# Patient Record
Sex: FEMALE | Race: WHITE
[De-identification: names, ages, dates, MRNs, and addresses within clinical notes are randomized per-mention and may not be internally consistent; named-entity substitution may affect disease eponyms.]

---

## 2018-05-31 ENCOUNTER — HOSPITAL ENCOUNTER (EMERGENCY)
Dept: HOSPITAL 76 - ED | Age: 53
Discharge: HOME | End: 2018-05-31
Payer: MEDICAID

## 2018-05-31 VITALS — DIASTOLIC BLOOD PRESSURE: 73 MMHG | SYSTOLIC BLOOD PRESSURE: 115 MMHG

## 2018-05-31 DIAGNOSIS — K21.9: ICD-10-CM

## 2018-05-31 DIAGNOSIS — I10: ICD-10-CM

## 2018-05-31 DIAGNOSIS — R07.9: Primary | ICD-10-CM

## 2018-05-31 DIAGNOSIS — M06.9: ICD-10-CM

## 2018-05-31 DIAGNOSIS — R00.2: ICD-10-CM

## 2018-05-31 DIAGNOSIS — Z96.641: ICD-10-CM

## 2018-05-31 LAB
ALBUMIN DIAFP-MCNC: 4.1 G/DL (ref 3.2–5.5)
ALBUMIN/GLOB SERPL: 1.4 {RATIO} (ref 1–2.2)
ALP SERPL-CCNC: 63 IU/L (ref 42–121)
ALT SERPL W P-5'-P-CCNC: 14 IU/L (ref 10–60)
ANION GAP SERPL CALCULATED.4IONS-SCNC: 6 MMOL/L (ref 6–13)
AST SERPL W P-5'-P-CCNC: 23 IU/L (ref 10–42)
BASOPHILS NFR BLD AUTO: 0 10^3/UL (ref 0–0.1)
BASOPHILS NFR BLD AUTO: 0.6 %
BILIRUB BLD-MCNC: 0.6 MG/DL (ref 0.2–1)
BUN SERPL-MCNC: 13 MG/DL (ref 6–20)
CALCIUM UR-MCNC: 9 MG/DL (ref 8.5–10.3)
CHLORIDE SERPL-SCNC: 100 MMOL/L (ref 101–111)
CO2 SERPL-SCNC: 30 MMOL/L (ref 21–32)
CREAT SERPLBLD-SCNC: 0.8 MG/DL (ref 0.4–1)
EOSINOPHIL # BLD AUTO: 0.2 10^3/UL (ref 0–0.7)
EOSINOPHIL NFR BLD AUTO: 5.8 %
ERYTHROCYTE [DISTWIDTH] IN BLOOD BY AUTOMATED COUNT: 13.9 % (ref 12–15)
GFRSERPLBLD MDRD-ARVRAT: 75 ML/MIN/{1.73_M2} (ref 89–?)
GLOBULIN SER-MCNC: 3 G/DL (ref 2.1–4.2)
GLUCOSE SERPL-MCNC: 98 MG/DL (ref 70–100)
HGB UR QL STRIP: 12.4 G/DL (ref 12–16)
LIPASE SERPL-CCNC: 35 U/L (ref 22–51)
LYMPHOCYTES # SPEC AUTO: 1.5 10^3/UL (ref 1.5–3.5)
LYMPHOCYTES NFR BLD AUTO: 33.9 %
MCH RBC QN AUTO: 28.9 PG (ref 27–31)
MCHC RBC AUTO-ENTMCNC: 33.7 G/DL (ref 32–36)
MCV RBC AUTO: 85.9 FL (ref 81–99)
MONOCYTES # BLD AUTO: 0.5 10^3/UL (ref 0–1)
MONOCYTES NFR BLD AUTO: 12.2 %
NEUTROPHILS # BLD AUTO: 2 10^3/UL (ref 1.5–6.6)
NEUTROPHILS # SNV AUTO: 4.3 X10^3/UL (ref 4.8–10.8)
NEUTROPHILS NFR BLD AUTO: 47.5 %
PDW BLD AUTO: 8.2 FL (ref 7.9–10.8)
PLATELET # BLD: 231 10^3/UL (ref 130–450)
PROT SPEC-MCNC: 7.1 G/DL (ref 6.7–8.2)
RBC MAR: 4.29 10^6/UL (ref 4.2–5.4)
SODIUM SERPLBLD-SCNC: 136 MMOL/L (ref 135–145)

## 2018-05-31 PROCEDURE — 83690 ASSAY OF LIPASE: CPT

## 2018-05-31 PROCEDURE — 99283 EMERGENCY DEPT VISIT LOW MDM: CPT

## 2018-05-31 PROCEDURE — 71275 CT ANGIOGRAPHY CHEST: CPT

## 2018-05-31 PROCEDURE — 71046 X-RAY EXAM CHEST 2 VIEWS: CPT

## 2018-05-31 PROCEDURE — 99284 EMERGENCY DEPT VISIT MOD MDM: CPT

## 2018-05-31 PROCEDURE — 36415 COLL VENOUS BLD VENIPUNCTURE: CPT

## 2018-05-31 PROCEDURE — 80053 COMPREHEN METABOLIC PANEL: CPT

## 2018-05-31 PROCEDURE — 93005 ELECTROCARDIOGRAM TRACING: CPT

## 2018-05-31 PROCEDURE — 84484 ASSAY OF TROPONIN QUANT: CPT

## 2018-05-31 PROCEDURE — 85025 COMPLETE CBC W/AUTO DIFF WBC: CPT

## 2018-05-31 NOTE — ED PHYSICIAN DOCUMENTATION
PD HPI CHEST PAIN





- Stated complaint


Stated Complaint: HBP/CHEST TIGHTNESS





- Chief complaint


Chief Complaint: Cardiac





- History obtained from


History obtained from: Patient, Family





- History of Present Illness


Timing - onset: Today (53-year-old woman with history of hypertension and 

rheumatoid arthritis.  She is maintained on atenolol and some immunologic's.  

Over the last couple weeks she has noted higher than normal blood pressures, 

usually around 160/100 at home in the mornings but it is better in the 

afternoon.  Today she was a physical therapy because she is 8 weeks out from a 

right total hip replacement and she felt her heart racing on the treadmill and 

then laid down and had mild chest heaviness for about a minute that is since 

gone.  She has no history of coronary disease.  No pedal edema or calf pain.  

No recent travel.)





Review of Systems


Ten Systems: 10 systems reviewed and negative


Constitutional: denies: Fever, Chills, Fatigue


Cardiac: denies: Pedal edema, Calf pain


Respiratory: denies: Dyspnea, Cough, Hemoptysis, Wheezing





PD PAST MEDICAL HISTORY





- Past Medical History


Past Medical History: Yes


Cardiovascular: Hypertension, Other


Respiratory: None


Endocrine/Autoimmune: None


GI: GERD


: None


HEENT: None


Psych: None


Musculoskeletal: Rheumatoid arthritis


Derm: None





- Past Surgical History


Past Surgical History: Yes


General: Cholecystectomy, Appendectomy


Ortho: Hip replacement


/GYN: Hysterectomy


HEENT: Tonsil/Adenoidectomy





- Present Medications


Home Medications: 


 Ambulatory Orders











 Medication  Instructions  Recorded  Confirmed


 


Atenolol 25 mg PO BID 03/13/17 09/05/17


 


Gabapentin 600 mg PO QID 03/13/17 09/05/17


 


Abatacept [Orencia] mg SQ 05/31/18 


 


Leflunomide [Arava] mg PO 05/31/18 














- Allergies


Allergies/Adverse Reactions: 


 Allergies











Allergy/AdvReac Type Severity Reaction Status Date / Time


 


codeine Allergy  Unknown Verified 05/31/18 11:35














- Social History


Does the pt smoke?: No


Smoking Status: Never smoker


Does the pt drink ETOH?: No


Does the pt have substance abuse?: No





- Immunizations


Immunizations are current?: Yes





- POLST


Patient has POLST: No





PD ED PE NORMAL





- Vitals


Vital signs reviewed: Yes





- General


General: Alert and oriented X 3, No acute distress





- HEENT


HEENT: PERRL, EOMI





- Neck


Neck: Supple, no meningeal sign, No bony TTP





- Cardiac


Cardiac: RRR, No murmur





- Respiratory


Respiratory: No respiratory distress, Clear bilaterally





- Abdomen


Abdomen: Normal bowel sounds, Soft, Non tender





- Back


Back: No CVA TTP, No spinal TTP





- Derm


Derm: Normal color, Warm and dry





- Extremities


Extremities: No edema, No calf tenderness / cord





- Neuro


Neuro: Alert and oriented X 3, Normal speech





Results





- Vitals


Vitals: 


 Vital Signs - 24 hr











  05/31/18 05/31/18 05/31/18





  11:32 13:48 15:29


 


Temperature 36.5 C  


 


Heart Rate 86 88 78


 


Respiratory 16 14 13





Rate   


 


Blood Pressure 143/91 H 142/87 H 115/73


 


O2 Saturation 99 100 100








 Oxygen











O2 Source                      Room air

















- EKG (time done)


  ** 1141


Rate: Rate (enter#) (88)


Rhythm: NSR


Axis: Normal


Intervals: Normal FL


QRS: Normal


Ischemia: Non specific changes (rsr prime v1/2)


Computer interpretation: Agree with computer





- Labs


Labs: 


 Laboratory Tests











  05/31/18 05/31/18 05/31/18





  11:49 11:49 11:49


 


WBC  4.3 L  


 


RBC  4.29  


 


Hgb  12.4  


 


Hct  36.9 L  


 


MCV  85.9  


 


MCH  28.9  


 


MCHC  33.7  


 


RDW  13.9  


 


Plt Count  231  


 


MPV  8.2  


 


Neut #  2.0  


 


Lymph #  1.5  


 


Mono #  0.5  


 


Eos #  0.2  


 


Baso #  0.0  


 


Absolute Nucleated RBC  0.00  


 


Nucleated RBC %  0.1  


 


Sodium   136 


 


Potassium   3.4 L 


 


Chloride   100 L 


 


Carbon Dioxide   30 


 


Anion Gap   6.0 


 


BUN   13 


 


Creatinine   0.8 


 


Estimated GFR (MDRD)   75 L 


 


Glucose   98 


 


Calcium   9.0 


 


Total Bilirubin   0.6 


 


AST   23 


 


ALT   14 


 


Alkaline Phosphatase   63 


 


Troponin I    < 0.04


 


Total Protein   7.1 


 


Albumin   4.1 


 


Globulin   3.0 


 


Albumin/Globulin Ratio   1.4 


 


Lipase   35 














  05/31/18





  14:42


 


WBC 


 


RBC 


 


Hgb 


 


Hct 


 


MCV 


 


MCH 


 


MCHC 


 


RDW 


 


Plt Count 


 


MPV 


 


Neut # 


 


Lymph # 


 


Mono # 


 


Eos # 


 


Baso # 


 


Absolute Nucleated RBC 


 


Nucleated RBC % 


 


Sodium 


 


Potassium 


 


Chloride 


 


Carbon Dioxide 


 


Anion Gap 


 


BUN 


 


Creatinine 


 


Estimated GFR (MDRD) 


 


Glucose 


 


Calcium 


 


Total Bilirubin 


 


AST 


 


ALT 


 


Alkaline Phosphatase 


 


Troponin I  < 0.04


 


Total Protein 


 


Albumin 


 


Globulin 


 


Albumin/Globulin Ratio 


 


Lipase 














- Rads (name of study)


  ** CT PA


Radiology: EMP read contemporaneously (negative)





PD MEDICAL DECISION MAKING





- ED course


ED course: 





53-year-old woman with history of rheumatoid arthritis and 8 weeks out from a 

hip replacement presents with a short episode of chest pain and palpitations 

while in physical therapy today.  Her diagnostics are negative here including 

delta troponin.  PE was considered given her postsurgical state but there is no 

evidence of this on CT.





Departure





- Departure


Disposition: 01 Home, Self Care


Clinical Impression: 


Chest pain


Qualifiers:


 Chest pain type: unspecified Qualified Code(s): R07.9 - Chest pain, unspecified





Condition: Good


Record reviewed to determine appropriate education?: Yes


Instructions:  ED Chest Pain NonCardiac


Comments: 


Return immediately for further episodes of chest pain.  Follow-up with your 

doctor and discuss stress testing. Also possible changes in blood pressure 

medications.


Discharge Date/Time: 05/31/18 15:29

## 2018-05-31 NOTE — XRAY REPORT
EXAM:

CHEST RADIOGRAPHY

 

EXAM DATE: 5/31/2018 12:02 PM.

 

CLINICAL HISTORY: Chest pain.

 

COMPARISON: None.

 

TECHNIQUE: 2 views.

 

FINDINGS: 

Lungs/Pleura: No focal opacities evident. No pleural effusion. No pneumothorax. Normal volumes.

 

Mediastinum: Heart and mediastinal contours are unremarkable.

 

Other: None.

 

IMPRESSION: Normal 2-view chest radiography.

 

[facility]

Referring Provider Line: [phone]

 

SITE ID: [id number]

## 2018-05-31 NOTE — CT REPORT
EXAM:

CT ANGIOGRAM CHEST

 

EXAM DATE: 5/31/2018 02:25 PM.

 

CLINICAL HISTORY: Chest pain, high heart rate status post THR. 

 

COMPARISON: None.

 

TECHNIQUE: Routine helical imaging was performed through the chest in the pulmonary arterial phase. I
V Contrast: 80 ML Isovue 300. Reconstructions: Coronal 3-D MIP reconstructions.Sagittal and coronal.

 

In accordance with CT protocol optimization, one or more of the following dose reduction techniques w
ere utilized for this exam: automated exposure control, adjustment of mA and/or KV based on patient s
ize, or use of iterative reconstructive technique.

 

FINDINGS: 

Pulmonary Arteries: 

Diagnostic quality: Adequate through the segmental arteries. No evidence for acute or chronic pulmona
ry emboli. 

 

RV/LV is within normal limits. There is no interventricular septal bowing. There is no reflux of cont
rast material in the IVC.

 

Lungs/Pleura: No consolidation, nodules, or edema. No effusions or pneumothorax.

 

Mediastinum: No cardiac enlargement or adenopathy. 

 

Thoracic Aorta: No thoracic aortic aneurysm or dissection.

 

Upper Abdomen: Status post cholecystectomy. No acute findings.

 

Other: No acute bone findings.

 

IMPRESSION: No evidence for pulmonary emboli. No acute findings are seen.

 

[facility]

Referring Provider Line: [phone]

 

SITE ID: [id number]

## 2018-05-31 NOTE — CT PRELIMINARY REPORT
Accession: [id number]

Exam: CT CHEST ANGIO (PE)

 

IMPRESSION: No evidence for pulmonary emboli. No acute findings are seen.

 

[facility] [place]

 

SITE ID: [id number]

## 2018-06-21 ENCOUNTER — HOSPITAL ENCOUNTER (EMERGENCY)
Dept: HOSPITAL 76 - ED | Age: 53
Discharge: HOME | End: 2018-06-21
Payer: MEDICAID

## 2018-06-21 ENCOUNTER — HOSPITAL ENCOUNTER (OUTPATIENT)
Dept: HOSPITAL 76 - EMS | Age: 53
Discharge: ANOTHER HOSPITAL | End: 2018-06-21
Attending: SURGERY
Payer: MEDICAID

## 2018-06-21 VITALS — SYSTOLIC BLOOD PRESSURE: 109 MMHG | DIASTOLIC BLOOD PRESSURE: 74 MMHG

## 2018-06-21 DIAGNOSIS — I10: ICD-10-CM

## 2018-06-21 DIAGNOSIS — M25.551: Primary | ICD-10-CM
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
## 2018-06-21 DIAGNOSIS — Z96.641: ICD-10-CM

## 2018-06-21 DIAGNOSIS — S73.004A: Primary | ICD-10-CM
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
## 2018-06-21 PROCEDURE — 72170 X-RAY EXAM OF PELVIS: CPT

## 2018-06-21 PROCEDURE — 99152 MOD SED SAME PHYS/QHP 5/>YRS: CPT

## 2018-06-21 PROCEDURE — 27266 TREAT HIP DISLOCATION: CPT

## 2018-06-21 PROCEDURE — 94770: CPT

## 2018-06-21 PROCEDURE — 99283 EMERGENCY DEPT VISIT LOW MDM: CPT

## 2018-06-21 NOTE — XRAY REPORT
Procedure Date:  06/21/2018   

Accession Number:  [id number] / [id number]                    

Procedure:  XR  - Hip w/Pelvis 2-3V RT CPT Code:  

 

FULL RESULT:

 

 

EXAM:

RIGHT HIP AND PELVIS RADIOGRAPHY

 

EXAM DATE: 6/21/2018 12:07 PM.

 

HISTORY: Right hip dislocation after lifting today.

 

COMPARISONS: 02/23/2017.

 

TECHNIQUE: 2 views including AP pelvis.

 

FINDINGS: Interval right total hip arthroplasty and superior lateral 

dislocation of prosthetic femoral head relative to acetabular component. 

No fracture or prosthetic loosening.

IMPRESSION:

1. Superior lateral dislocation of prosthetic right femoral head.

2. No fracture.

 

[facility]

## 2018-06-21 NOTE — ED PHYSICIAN DOCUMENTATION
PD HPI LOWER EXT INJURY





- Stated complaint


Stated Complaint: HIP DISLOCATED





- Chief complaint


Chief Complaint: General





- History obtained from


History obtained from: Patient





- History of Present Illness


PD HPI LOW EXT INJURY LOCATION: Right, Hip


Type of injury: Other (Dislocation)


Where injury occurred: Home


Timing - onset: How many minutes ago (Just prior to arrival.)


Contributing factors: Prior ortho surgery (3 months S/P right THR.)


Similar symptoms before: Has not had sx before





- Treatment prior to arrival


Treatment prior to arrival: 





Fentanyl 150 mcg IV administered by medics.





- Additional information


Additional information: 





The patient is a 53-year-old female who is 3 months status post right total hip 

replacement, who presents with pain in her right hip after squatting down this 

morning.  She felt her hip dislocate, and has been unable to move her right leg 

since that time.  She arrives via ambulance, and medics have administered 150 

g fentanyl IV.  She has no prior history of hip dislocation.





Review of Systems


Constitutional: denies: Fever


Cardiac: denies: Chest pain / pressure


Respiratory: denies: Dyspnea, Cough


GI: denies: Abdominal Pain, Nausea, Vomiting


Musculoskeletal: reports: Joint pain (right hip)


Neurologic: denies: Focal weakness, Numbness, Headache





PD PAST MEDICAL HISTORY





- Past Medical History


Past Medical History: Yes


Cardiovascular: Hypertension, Other


Respiratory: None


Endocrine/Autoimmune: None


GI: GERD


: None


HEENT: None


Psych: None


Musculoskeletal: Rheumatoid arthritis


Derm: None





- Past Surgical History


Past Surgical History: Yes


General: Cholecystectomy, Appendectomy


Ortho: Hip replacement


/GYN: Hysterectomy


HEENT: Tonsil/Adenoidectomy





- Present Medications


Home Medications: 


 Ambulatory Orders











 Medication  Instructions  Recorded  Confirmed


 


Atenolol 25 mg PO BID 03/13/17 09/05/17


 


Gabapentin 600 mg PO QID 03/13/17 09/05/17


 


Abatacept [Orencia] mg SQ 05/31/18 


 


Leflunomide [Arava] mg PO 05/31/18 


 


Omeprazole [PriLOSEC] 20 mg PO DAILY 06/21/18 06/21/18














- Allergies


Allergies/Adverse Reactions: 


 Allergies











Allergy/AdvReac Type Severity Reaction Status Date / Time


 


codeine Allergy  Unknown Verified 06/21/18 12:08














- Social History


Does the pt smoke?: No


Smoking Status: Never smoker


Does the pt drink ETOH?: No


Does the pt have substance abuse?: No





- Immunizations


Immunizations are current?: Yes





- POLST


Patient has POLST: No





PD ED PE NORMAL





- Vitals


Vital signs reviewed: Yes (hypertensive)





- General


General: Alert and oriented X 3, Well developed/nourished





- HEENT


HEENT: Atraumatic





- Cardiac


Cardiac: RRR





- Respiratory


Respiratory: No respiratory distress, Clear bilaterally





- Abdomen


Abdomen: Soft, Non tender





- Back


Back: No spinal TTP





- Derm


Derm: No rash





- Extremities


Extremities: No edema, No calf tenderness / cord, Other (The right leg is 

internally rotated and shortened, consistent with right hip dislocation.  

Distal neurovascular is intact.)





- Neuro


Neuro: Alert and oriented X 3, No motor deficit, No sensory deficit





Results





- Vitals


Vitals: 


 Oxygen











O2 Source                      Room air

















- Rads (name of study)


  ** Right hip


Radiology: Prelim report reviewed, EMP read contemporaneously, See rad report (

Superolateral dislocation of right femoral head.  No fracture.)





  ** Post reduction hip


Radiology: Prelim report reviewed, EMP read contemporaneously, See rad report (

Right hip joint alignment is anatomic following reduction.)





Procedures





- Reduction


Body part reduced: Right, Hip, prosthetic


Fracture or dislocation: Dislocation


Anesthesia: Fentanyl, Propofol, Versed


Hip reduction technique: Allis - flex/pull/rotate


Reduction aftercare: NV intact, Xray confirms reduction, Alignment improved, 

Patient tolerated well





PD MEDICAL DECISION MAKING





- ED course


Complexity details: reviewed results, re-evaluated patient, considered 

differential, d/w patient, d/w family


ED course: 





The patient's presentation is significant for right prosthetic hip dislocation.

  There is no evidence of fracture on radiographic examination.


Treatment in the emergency department included administration of fentanyl 100 

g IV followed by an additional 50 g.  After explaining the risks and benefits 

of procedural sedation to the patient and her , the right hip was 

reduced with the benefit of sedation using 1 mg Versed IV and 70 mg propofol 

IV.  Respiratory therapist and nurse were in attendance in addition to myself.  

After reduction of the dislocated hip, the patient recovered from sedation 

without any complications.  Postreduction x-ray reveals anatomic alignment.


I discussed with her and her partner the pathophysiology of prosthetic hip 

dislocation, maneuvers to avoid, as well as potentially worrisome signs or 

symptoms that should prompt reevaluation in the emergency department.





- Sepsis Event


Vital Signs: 


 Oxygen











O2 Source                      Room air

















Departure





- Departure


Disposition: 01 Home, Self Care


Clinical Impression: 


Hip dislocation, right


Qualifiers:


 Encounter type: initial encounter Qualified Code(s): S73.004A - Unspecified 

dislocation of right hip, initial encounter





Condition: Stable


Instructions:  ED Hip Replace Dislocation Reduc


Comments: 


You can use Tylenol or ibuprofen if needed for discomfort.


Follow up with your orthopedic surgeon if you have persistent pain in your hip.


Return to the emergency department if you develop recurrent dislocation, or 

otherwise worsening symptoms.


Discharge Date/Time: 06/21/18 13:51

## 2018-06-21 NOTE — XRAY REPORT
Procedure Date:  06/21/2018   

Accession Number:  [id number] / [id number]                    

Procedure:  XR  - Pelvis 1 View CPT Code:  

 

FULL RESULT:

 

 

EXAM:

PELVIS RADIOGRAPHY

 

EXAM DATE: 6/21/2018 01:06 PM.

 

CLINICAL HISTORY: Post reduction right hip.

 

COMPARISON: Earlier the same day at 1153 hrs..

 

TECHNIQUE: 1 view.

 

FINDINGS:

Bones: No acute fracture. Right hip arthroplasty in place.

 

Joints: Alignment is anatomic following reduction.

 

Soft Tissues: No significant abnormality.

IMPRESSION: Right hip joint alignment is anatomic following reduction.

 

[facility]

## 2019-04-04 ENCOUNTER — HOSPITAL ENCOUNTER (OUTPATIENT)
Dept: HOSPITAL 76 - EMS | Age: 54
Discharge: ANOTHER HOSPITAL | End: 2019-04-04
Attending: SURGERY
Payer: MEDICAID

## 2019-04-04 DIAGNOSIS — M79.604: Primary | ICD-10-CM

## 2019-04-04 DIAGNOSIS — T14.8XXA: ICD-10-CM

## 2019-10-08 ENCOUNTER — HOSPITAL ENCOUNTER (EMERGENCY)
Dept: HOSPITAL 76 - ED | Age: 54
Discharge: HOME | End: 2019-10-08
Payer: MEDICAID

## 2019-10-08 VITALS — DIASTOLIC BLOOD PRESSURE: 71 MMHG | SYSTOLIC BLOOD PRESSURE: 116 MMHG

## 2019-10-08 DIAGNOSIS — E04.2: ICD-10-CM

## 2019-10-08 DIAGNOSIS — M06.9: ICD-10-CM

## 2019-10-08 DIAGNOSIS — G50.1: ICD-10-CM

## 2019-10-08 DIAGNOSIS — I10: ICD-10-CM

## 2019-10-08 DIAGNOSIS — G51.9: Primary | ICD-10-CM

## 2019-10-08 LAB
ALBUMIN DIAFP-MCNC: 4.5 G/DL (ref 3.2–5.5)
ALBUMIN/GLOB SERPL: 1.4 {RATIO} (ref 1–2.2)
ALP SERPL-CCNC: 61 IU/L (ref 42–121)
ALT SERPL W P-5'-P-CCNC: 11 IU/L (ref 10–60)
ANION GAP SERPL CALCULATED.4IONS-SCNC: 13 MMOL/L (ref 6–13)
AST SERPL W P-5'-P-CCNC: 19 IU/L (ref 10–42)
BASOPHILS NFR BLD AUTO: 0 10^3/UL (ref 0–0.1)
BASOPHILS NFR BLD AUTO: 0.6 %
BILIRUB BLD-MCNC: 0.4 MG/DL (ref 0.2–1)
BUN SERPL-MCNC: 20 MG/DL (ref 6–20)
CALCIUM UR-MCNC: 9.8 MG/DL (ref 8.5–10.3)
CHLORIDE SERPL-SCNC: 100 MMOL/L (ref 101–111)
CO2 SERPL-SCNC: 26 MMOL/L (ref 21–32)
CREAT SERPLBLD-SCNC: 0.9 MG/DL (ref 0.4–1)
EOSINOPHIL # BLD AUTO: 0.3 10^3/UL (ref 0–0.7)
EOSINOPHIL NFR BLD AUTO: 3.9 %
ERYTHROCYTE [DISTWIDTH] IN BLOOD BY AUTOMATED COUNT: 16 % (ref 12–15)
GFRSERPLBLD MDRD-ARVRAT: 65 ML/MIN/{1.73_M2} (ref 89–?)
GLOBULIN SER-MCNC: 3.2 G/DL (ref 2.1–4.2)
GLUCOSE SERPL-MCNC: 122 MG/DL (ref 70–100)
HGB UR QL STRIP: 12.2 G/DL (ref 12–16)
LIPASE SERPL-CCNC: 38 U/L (ref 22–51)
LYMPHOCYTES # SPEC AUTO: 1.8 10^3/UL (ref 1.5–3.5)
LYMPHOCYTES NFR BLD AUTO: 25.6 %
MCH RBC QN AUTO: 26 PG (ref 27–31)
MCHC RBC AUTO-ENTMCNC: 31.5 G/DL (ref 32–36)
MCV RBC AUTO: 82.5 FL (ref 81–99)
MONOCYTES # BLD AUTO: 0.5 10^3/UL (ref 0–1)
MONOCYTES NFR BLD AUTO: 6.4 %
NEUTROPHILS # BLD AUTO: 4.5 10^3/UL (ref 1.5–6.6)
NEUTROPHILS # SNV AUTO: 7.2 X10^3/UL (ref 4.8–10.8)
NEUTROPHILS NFR BLD AUTO: 63.2 %
PDW BLD AUTO: 11.4 FL (ref 7.9–10.8)
PLATELET # BLD: 241 10^3/UL (ref 130–450)
PROT SPEC-MCNC: 7.7 G/DL (ref 6.7–8.2)
RBC MAR: 4.69 10^6/UL (ref 4.2–5.4)
SODIUM SERPLBLD-SCNC: 139 MMOL/L (ref 135–145)

## 2019-10-08 PROCEDURE — 70450 CT HEAD/BRAIN W/O DYE: CPT

## 2019-10-08 PROCEDURE — 99283 EMERGENCY DEPT VISIT LOW MDM: CPT

## 2019-10-08 PROCEDURE — 36415 COLL VENOUS BLD VENIPUNCTURE: CPT

## 2019-10-08 PROCEDURE — 83690 ASSAY OF LIPASE: CPT

## 2019-10-08 PROCEDURE — 99284 EMERGENCY DEPT VISIT MOD MDM: CPT

## 2019-10-08 PROCEDURE — 70491 CT SOFT TISSUE NECK W/DYE: CPT

## 2019-10-08 PROCEDURE — 85025 COMPLETE CBC W/AUTO DIFF WBC: CPT

## 2019-10-08 PROCEDURE — 80053 COMPREHEN METABOLIC PANEL: CPT

## 2019-10-08 NOTE — ED PHYSICIAN DOCUMENTATION
History of Present Illness





- Stated complaint


Stated Complaint: DIFFICULTY SWALLOWING





- Chief complaint


Chief Complaint: Heent





- History obtained from


History obtained from: Patient





- History of Present Illness


Timing: Other (54-year-old woman with history of stable rheumatoid arthritis 

presents with 2 months of worsening symptoms.  It started with left-sided facial

pain developed into tingling of the face on the left not involving the forehead,

now has difficulty swallowing and numbness on the left side of her throat she 

thinks and feeling like something stuck in her throat with a cough that is r

elated to her throat not her chest.  She denies fevers, chills, congestion, 

sinus drainage.  She saw her physician who is a resident and felt she might have

TMJ and referred her to ENT but has not been able to see them yet.)





Review of Systems


Constitutional: denies: Fever, Chills, Myalgias, Fatigue


Ears: reports: Ear pain


Nose: denies: Rhinorrhea / runny nose, Congestion


Throat: reports: Sore throat


Respiratory: reports: Cough.  denies: Dyspnea


GI: denies: Abdominal Pain





PD PAST MEDICAL HISTORY





- Past Medical History


Past Medical History: Yes


Cardiovascular: Hypertension, Other


Respiratory: None


Endocrine/Autoimmune: None


GI: GERD


: None


HEENT: None


Psych: None


Musculoskeletal: Rheumatoid arthritis


Derm: None





- Past Surgical History


Past Surgical History: Yes


General: Cholecystectomy, Appendectomy


Ortho: Hip replacement


/GYN: Hysterectomy


HEENT: Tonsil/Adenoidectomy





- Present Medications


Home Medications: 


                                Ambulatory Orders











 Medication  Instructions  Recorded  Confirmed


 


Atenolol 25 mg PO BID 03/13/17 09/05/17


 


Gabapentin 600 mg PO QID 03/13/17 09/05/17


 


Abatacept [Orencia] mg SQ 05/31/18 


 


Leflunomide [Arava] mg PO 05/31/18 


 


Omeprazole [PriLOSEC] 20 mg PO DAILY 06/21/18 06/21/18














- Allergies


Allergies/Adverse Reactions: 


                                    Allergies











Allergy/AdvReac Type Severity Reaction Status Date / Time


 


codeine Allergy  Unknown Verified 10/08/19 11:52














- Social History


Does the pt smoke?: No


Smoking Status: Never smoker


Does the pt drink ETOH?: No


Does the pt have substance abuse?: No





- Immunizations


Immunizations are current?: Yes





- POLST


Patient has POLST: No





PD ED PE NORMAL





- Vitals


Vital signs reviewed: Yes





- General


General: Alert and oriented X 3, No acute distress





- HEENT


HEENT: PERRL, EOMI, Other (She has mild asymmetry of the soft palate on palate 

raise.  Otherwise the visualized portions of the oropharynx are normal.  She has

 partial numbness of the lower left side of face not involving the forehead.  

Smile is slightly asymmetric.  Extra ocular movements are normal.)





- Neck


Neck: Supple, no meningeal sign, No bony TTP





- Neuro


Neuro: Alert and oriented X 3, Normal speech


Eye Opening: Spontaneous


Motor: Obeys Commands


Verbal: Oriented


GCS Score: 15





Results





- Vitals


Vitals: 


                               Vital Signs - 24 hr











  10/08/19





  11:50


 


Temperature 36.7 C


 


Heart Rate 91


 


Respiratory 16





Rate 


 


Blood Pressure 100/82 H


 


O2 Saturation 100








                                     Oxygen











O2 Source                      Room air

















- Labs


Labs: 


                                Laboratory Tests











  10/08/19 10/08/19





  12:45 12:45


 


WBC  7.2 


 


RBC  4.69 


 


Hgb  12.2 


 


Hct  38.7 


 


MCV  82.5 


 


MCH  26.0 L 


 


MCHC  31.5 L 


 


RDW  16.0 H 


 


Plt Count  241 


 


MPV  11.4 H 


 


Neut # (Auto)  4.5 


 


Lymph # (Auto)  1.8 


 


Mono # (Auto)  0.5 


 


Eos # (Auto)  0.3 


 


Baso # (Auto)  0.0 


 


Absolute Nucleated RBC  0.00 


 


Nucleated RBC %  0.0 


 


Sodium   139


 


Potassium   3.8


 


Chloride   100 L


 


Carbon Dioxide   26


 


Anion Gap   13.0


 


BUN   20


 


Creatinine   0.9


 


Estimated GFR (MDRD)   65 L


 


Glucose   122 H


 


Calcium   9.8


 


Total Bilirubin   0.4


 


AST   19


 


ALT   11


 


Alkaline Phosphatase   61


 


Total Protein   7.7


 


Albumin   4.5


 


Globulin   3.2


 


Albumin/Globulin Ratio   1.4


 


Lipase   38














- Rads (name of study)


  ** CT head and neck (neck w contrast)


Radiology: EMP read contemporaneously (Thyroid nodules, otherwise negative)





PD MEDICAL DECISION MAKING





- ED course


ED course: 





54-year-old woman presents with some odd symptoms with some cranial 

neuropathies.  As such CT imaging of the head and neck were done to evaluate for

 mass lesion and they were negative.  ENT follow-up was advised.





Departure





- Departure


Disposition: 01 Home, Self Care


Clinical Impression: 


 Cranial neuropathies, multiple, Facial pain





Condition: Good


Record reviewed to determine appropriate education?: Yes


Instructions:  ED Neuropathy Peripheral


Comments: 


The CT of the neck with contrast and head show only small thyroid nodules which 

would not explain your symptoms nor do they need specific followup. You should 

however followup with ENT as you are already trying to arrange.

## 2019-10-08 NOTE — CT REPORT
Reason:  L cranial neuropathies and L facial pain

Procedure Date:  10/08/2019   

Accession Number:  [id number] / [id number]                    

Procedure:  CT  - SOFT TISSUE NECK W CPT Code:  

 

FULL RESULT:

 

 

EXAM:

CT SOFT TISSUE NECK WITH CONTRAST.

 

EXAM DATE: 10/8/2019 01:29 PM.

 

HISTORY: 54-year-old female, left cranial neuropathies and left facial 

pain.

 

COMPARISONS: CT soft tissue neck 09/03/2017

 

TECHNIQUE: Routine soft tissue neck CT protocol with contrast. 

Reconstructions: Coronal and sagittal. IV contrast: OPTI 320 80ML.

 

In accordance with CT protocol optimization, one or more of the following 

dose reduction techniques were utilized for this exam: automated exposure 

control, adjustment of mA and/or KV based on patient size, or use of 

iterative reconstructive technique.

 

FINDINGS:

Visualized Intracranial Contents: Unremarkable.

 

Orbits: Symmetric and unremarkable.

 

Sinuses: Visualized paranasal sinuses and mastoid air cells are clear.

 

Oral cavity: The visualized oral cavity is unremarkable. The floor of the 

mouth is symmetric.

 

Pharynx: Pharyngeal mucosa is unremarkable. The infratemporal fossa, 

parapharyngeal spaces, and retropharyngeal space are unremarkable. The 

base of the tongue is symmetric and unremarkable. The airway is patent.

 

Larynx: Larynx and supraglottic airway are patent without mass lesion. 

Vocal cords are symmetric. The visualized trachea is unremarkable.

 

Parotid and Submandibular Glands: Symmetric and unremarkable.

 

Lymph Nodes: No enlarged lymph nodes are identified in the cervical, 

supraclavicular, and visualized superior mediastinal regions.

 

Soft tissues: Soft tissues are unremarkable. No mass lesion or abnormal 

enhancement.

 

Vascular Structures: Unremarkable.

 

Thyroid Gland: There is a 1.3 cm hypodense lesion in the right thyroid 

lobe (series 2 image 101) and a 9 mm lesion within the inferior left 

thyroid lobe (series 4 image 71), likely present on the prior study as 

well although better visualized on the current one. Given size below 1.5 

cm, no specific follow-up suggested.

 

Lung: The visualized lung apices are clear.

 

Bones: No evidence of acute fracture or malalignment. There are mild 

multilevel degenerative changes.

 

Other: None.

IMPRESSION:

1. There is a 1.3 cm hypodense lesion in the right thyroid lobe (series 2 

image 101) and a 9 mm lesion within the inferior left thyroid lobe 

(series 4 image 71), likely present on the prior study as well although 

better visualized on the current one. Given size below 1.5 cm, no 

specific follow-up suggested.

 

2. Otherwise no evidence of soft tissue mass, adenopathy, abscess, or 

definite mucosal abnormality.

 

[facility]

## 2019-10-08 NOTE — CT REPORT
Reason:  L cranial neuropathies and L facial pain

Procedure Date:  10/08/2019   

Accession Number:  [id number] / [id number]                    

Procedure:  CT  - HEAD WO CPT Code:  

 

FULL RESULT:

 

 

EXAM:

CT HEAD

 

EXAM DATE: 10/8/2019 01:29 PM.

 

CLINICAL HISTORY: Left cranial neuropathies and left facial pain.

 

COMPARISON: HEAD W/O 09/03/2017 1:48 PM.

 

TECHNIQUE: Multiaxial CT images were obtained from the foramen magnum to 

the vertex. Reformats: Sagittal and coronal. IV contrast: None.

 

In accordance with CT protocol optimization, one or more of the following 

dose reduction techniques were utilized for this exam: automated exposure 

control, adjustment of mA and/or KV based on patient size, or use of 

iterative reconstructive technique.

 

FINDINGS:

Parenchyma: No intraparenchymal hemorrhage. No evidence of mass, midline 

shift, or CT findings of infarction. Gray-white differentiation is 

distinct.

 

Extraaxial Spaces: Normal for age. No subdural or epidural collections 

identified.

 

Ventricles: Normal in size and position.

 

Sinuses and Orbits: Imaged paranasal sinuses, orbits, and mastoids show 

no significant abnormality.

 

Bones: No evidence of fracture or calvarial defect.

 

Other: None.

IMPRESSION: Normal unenhanced head CT. No CT findings to explain symptoms.

 

RADIA

## 2020-01-19 ENCOUNTER — HOSPITAL ENCOUNTER (EMERGENCY)
Dept: HOSPITAL 76 - ED | Age: 55
Discharge: HOME | End: 2020-01-19
Payer: MEDICAID

## 2020-01-19 ENCOUNTER — HOSPITAL ENCOUNTER (OUTPATIENT)
Dept: HOSPITAL 76 - EMS | Age: 55
Discharge: ANOTHER HOSPITAL | End: 2020-01-19
Attending: SURGERY
Payer: MEDICAID

## 2020-01-19 VITALS — DIASTOLIC BLOOD PRESSURE: 61 MMHG | SYSTOLIC BLOOD PRESSURE: 113 MMHG

## 2020-01-19 DIAGNOSIS — G50.1: Primary | ICD-10-CM

## 2020-01-19 DIAGNOSIS — G43.909: ICD-10-CM

## 2020-01-19 DIAGNOSIS — R11.0: ICD-10-CM

## 2020-01-19 DIAGNOSIS — R51: Primary | ICD-10-CM

## 2020-01-19 LAB
ALBUMIN DIAFP-MCNC: 4.3 G/DL (ref 3.2–5.5)
ALBUMIN/GLOB SERPL: 1.5 {RATIO} (ref 1–2.2)
ALP SERPL-CCNC: 53 IU/L (ref 42–121)
ALT SERPL W P-5'-P-CCNC: 12 IU/L (ref 10–60)
ANION GAP SERPL CALCULATED.4IONS-SCNC: 10 MMOL/L (ref 6–13)
AST SERPL W P-5'-P-CCNC: 19 IU/L (ref 10–42)
BASOPHILS NFR BLD AUTO: 0 10^3/UL (ref 0–0.1)
BASOPHILS NFR BLD AUTO: 0.4 %
BILIRUB BLD-MCNC: 0.3 MG/DL (ref 0.2–1)
BUN SERPL-MCNC: 15 MG/DL (ref 6–20)
CALCIUM UR-MCNC: 9.4 MG/DL (ref 8.5–10.3)
CHLORIDE SERPL-SCNC: 101 MMOL/L (ref 101–111)
CO2 SERPL-SCNC: 26 MMOL/L (ref 21–32)
CREAT SERPLBLD-SCNC: 0.7 MG/DL (ref 0.4–1)
EOSINOPHIL # BLD AUTO: 0.1 10^3/UL (ref 0–0.7)
EOSINOPHIL NFR BLD AUTO: 1.6 %
ERYTHROCYTE [DISTWIDTH] IN BLOOD BY AUTOMATED COUNT: 14.2 % (ref 12–15)
GFRSERPLBLD MDRD-ARVRAT: 87 ML/MIN/{1.73_M2} (ref 89–?)
GLOBULIN SER-MCNC: 2.9 G/DL (ref 2.1–4.2)
GLUCOSE SERPL-MCNC: 110 MG/DL (ref 70–100)
HGB UR QL STRIP: 11.1 G/DL (ref 12–16)
LIPASE SERPL-CCNC: 36 U/L (ref 22–51)
LYMPHOCYTES # SPEC AUTO: 1.4 10^3/UL (ref 1.5–3.5)
LYMPHOCYTES NFR BLD AUTO: 19.7 %
MAGNESIUM SERPL-MCNC: 2 MG/DL (ref 1.7–2.8)
MCH RBC QN AUTO: 26.2 PG (ref 27–31)
MCHC RBC AUTO-ENTMCNC: 31.7 G/DL (ref 32–36)
MCV RBC AUTO: 82.5 FL (ref 81–99)
MONOCYTES # BLD AUTO: 0.5 10^3/UL (ref 0–1)
MONOCYTES NFR BLD AUTO: 7 %
NEUTROPHILS # BLD AUTO: 4.9 10^3/UL (ref 1.5–6.6)
NEUTROPHILS # SNV AUTO: 6.9 X10^3/UL (ref 4.8–10.8)
NEUTROPHILS NFR BLD AUTO: 70.9 %
PDW BLD AUTO: 10.3 FL (ref 7.9–10.8)
PLATELET # BLD: 249 10^3/UL (ref 130–450)
PROT SPEC-MCNC: 7.2 G/DL (ref 6.7–8.2)
RBC MAR: 4.24 10^6/UL (ref 4.2–5.4)
SODIUM SERPLBLD-SCNC: 137 MMOL/L (ref 135–145)

## 2020-01-19 PROCEDURE — 80320 DRUG SCREEN QUANTALCOHOLS: CPT

## 2020-01-19 PROCEDURE — 83690 ASSAY OF LIPASE: CPT

## 2020-01-19 PROCEDURE — 96361 HYDRATE IV INFUSION ADD-ON: CPT

## 2020-01-19 PROCEDURE — 96375 TX/PRO/DX INJ NEW DRUG ADDON: CPT

## 2020-01-19 PROCEDURE — 99284 EMERGENCY DEPT VISIT MOD MDM: CPT

## 2020-01-19 PROCEDURE — 36415 COLL VENOUS BLD VENIPUNCTURE: CPT

## 2020-01-19 PROCEDURE — 80053 COMPREHEN METABOLIC PANEL: CPT

## 2020-01-19 PROCEDURE — 83735 ASSAY OF MAGNESIUM: CPT

## 2020-01-19 PROCEDURE — 85025 COMPLETE CBC W/AUTO DIFF WBC: CPT

## 2020-01-19 PROCEDURE — 96374 THER/PROPH/DIAG INJ IV PUSH: CPT

## 2020-01-19 PROCEDURE — 85651 RBC SED RATE NONAUTOMATED: CPT

## 2020-01-19 NOTE — ED PHYSICIAN DOCUMENTATION
PD HPI HEADACHE





- Stated complaint


Stated Complaint: L SIDE HEAD PAIN





- History obtained from


History obtained from: Patient





- History of Present Illness


Timing - onset: Last night (onet of left headache with nausea and light 

sensitivity last evening. Has had migraines remotely, with last one about a year

ago. She has been having 1 or 2 months of left sided facial pain in the jaw and 

cheek.  She had been seen by her primary care and also referred to ENT with 

evaluation by them.  No diagnosis currently.  They had not prescribed any 

medications for her other than NSAIDs.)


Timing - onset during: Rest


Timing - details: Gradual onset, Still present


Worst headache ever?: No: Worst headache ever? (She states her current headache 

is feeling similar to migraine she has had in the past.)


Location: Left


Quality: Throbbing, Aching


Associated symptoms: Nausea, Vision changes.  No: Fever, Stiff neck, Weakness, 

Syncope


Improved by: Dark room


Worsened by: Light


Contributing factors: No: Hypertension, Recent illness, Trauma


Similar symptoms before: Diagnosis (The current left-sided headache is similar 

to migraine she has had in the past.  However the ongoing left facial pain is 

different than her migraines but has been more consistent for now 1 or 2 

months.)


Recently seen: Clinic (ENT, Without a clear diagnosis for her facial pain.  The 

facial pain she has had is been fairly consistent with intermittent sharp jabs 

of pain along the mandible and cheek.  No rash or redness along the way.  No 

noted injury.  She denies any weakness or sensory changes in the face.)





Review of Systems


Constitutional: denies: Fever, Chills


Nose: denies: Rhinorrhea / runny nose, Congestion


Throat: denies: Sore throat


Respiratory: denies: Cough


Skin: denies: Rash, Lesions


Musculoskeletal: denies: Neck pain, Back pain





PD PAST MEDICAL HISTORY





- Past Medical History


Cardiovascular: None


Respiratory: None


Neuro: Migraines


Endocrine/Autoimmune: None





- Present Medications


Home Medications: 


                                Ambulatory Orders











 Medication  Instructions  Recorded  Confirmed


 


Gabapentin 300 mg PO TID 01/19/20 01/19/20


 


HYDROmorphone [Dilaudid] 2 mg PO Q6HR PRN 01/19/20 01/19/20


 


Hydrocodone/Acetaminophen [Norco 1 each PO Q6H PRN #20 tablet 01/19/20 





5-325 Tablet]   


 


Ondansetron Odt [Zofran] 4 mg TL Q6H PRN #15 tablet 01/19/20 


 


SUMAtriptan succinate [Sumatriptan 50 mg PO DAILY PRN #10 tablet 01/19/20 





Succinate]   


 


carBAMazepine [TEGretol] 100 mg PO BID #30 tablet 01/19/20 


 


dexAMETHasone [Decadron] 4 mg PO DAILY #7 tablet 01/19/20 














- Allergies


Allergies/Adverse Reactions: 


                                    Allergies











Allergy/AdvReac Type Severity Reaction Status Date / Time


 


codeine Allergy  Edema Verified 01/19/20 01:21














PD ED PE NORMAL





- Vitals


Vital signs reviewed: Yes





- General


General: Alert and oriented X 3, Well developed/nourished, Other (She does 

appear uncomfortable due to her headache and also winces intermittently with the

 facial pain.  She does have light sensitivity.)





- HEENT


HEENT: Ears normal, Pharynx benign, Dentition benign





- Neck


Neck: Supple, no meningeal sign, No adenopathy





- Cardiac


Cardiac: RRR, No murmur





- Respiratory


Respiratory: Clear bilaterally





- Abdomen


Abdomen: Soft, Non tender





- Derm


Derm: Normal color, Warm and dry





- Neuro


Neuro: Alert and oriented X 3, CNs 2-12 intact, No motor deficit, No sensory 

deficit, Normal speech, Other


Eye Opening: Spontaneous


Motor: Obeys Commands


Verbal: Oriented


GCS Score: 15





Results





- Vitals


Vitals: 


                               Vital Signs - 24 hr











  01/19/20 01/19/20 01/19/20





  01:15 01:45 02:35


 


Temperature 37 C  36.5 C


 


Heart Rate 89 89 87


 


Respiratory 16 16 15





Rate   


 


Blood Pressure 145/83 H 134/78 H 113/61


 


O2 Saturation 100 99 97








                                     Oxygen











O2 Source                      Room air

















- Labs


Labs: 


                                Laboratory Tests











  01/19/20 01/19/20 01/19/20





  01:20 01:20 01:20


 


WBC  6.9  


 


RBC  4.24  


 


Hgb  11.1 L  


 


Hct  35.0 L  


 


MCV  82.5  


 


MCH  26.2 L  


 


MCHC  31.7 L  


 


RDW  14.2  


 


Plt Count  249  


 


MPV  10.3  


 


Neut # (Auto)  4.9  


 


Lymph # (Auto)  1.4 L  


 


Mono # (Auto)  0.5  


 


Eos # (Auto)  0.1  


 


Baso # (Auto)  0.0  


 


Absolute Nucleated RBC  0.00  


 


Nucleated RBC %  0.0  


 


ESR    6


 


Sodium   137 


 


Potassium   3.2 L 


 


Chloride   101 


 


Carbon Dioxide   26 


 


Anion Gap   10.0 


 


BUN   15 


 


Creatinine   0.7 


 


Estimated GFR (MDRD)   87 L 


 


Glucose   110 H 


 


Calcium   9.4 


 


Magnesium   2.0 


 


Total Bilirubin   0.3 


 


AST   19 


 


ALT   12 


 


Alkaline Phosphatase   53 


 


Total Protein   7.2 


 


Albumin   4.3 


 


Globulin   2.9 


 


Albumin/Globulin Ratio   1.5 


 


Lipase   36 


 


Ethyl Alcohol   < 5.0 














PD MEDICAL DECISION MAKING





- ED course


Complexity details: re-evaluated patient (She is improved quite a bit with IV 

fluids and medications targeted at migraine.  We discussed the facial pain she 

has been having.  There is no diagnosis clearly at this point.  The character of

 it sounds like facial neuralgia neuralgia.  We could empirically try treating 

with anti-inflammatories and steroids.  I discussed with her potentially trying 

Tegretol to see if it would help with the neuralgia.  She is in favor of trying 

this at this point.  She is given medication to use for her migraine should 

those recur she states she had had improvement in the past with sumatriptan just

 does not have any current prescriptions.), considered differential, d/w patient





Departure





- Departure


Disposition: 01 Home, Self Care


Clinical Impression: 


 Acute facial pain





Migraine


Qualifiers:


 Migraine type: unspecified Status migrainosus presence: without status 

migrainosus Intractability: not intractable Qualified Code(s): G43.909 - 

Migraine, unspecified, not intractable, without status migrainosus





Condition: Stable


Record reviewed to determine appropriate education?: Yes


Instructions:  ED Headache Migraine, ED Neuralgia Trigeminal


Prescriptions: 


carBAMazepine [TEGretol] 100 mg PO BID #30 tablet


dexAMETHasone [Decadron] 4 mg PO DAILY #7 tablet


Hydrocodone/Acetaminophen [Norco 5-325 Tablet] 1 each PO Q6H PRN #20 tablet


 PRN Reason: Pain


Ondansetron Odt [Zofran] 4 mg TL Q6H PRN #15 tablet


 PRN Reason: Nausea / Vomiting


SUMAtriptan succinate [Sumatriptan Succinate] 50 mg PO DAILY PRN #10 tablet


 PRN Reason: Migraine


Comments: 


For the severe headache/migraine, you can use ondansetron nausea medicine along 

with an ibuprofen or naproxen and take those with sumatriptan migraine medicine.

 These would be for the migraine type headaches.  It would be as needed episodic

use.





The facial pain you have been having sounds like it may be an irritation of the 

facial nerve called the trigeminal nerve.  We can try some anti-inflammatories 

of Decadron steroid daily for a week.  Use Tylenol or hydrocodone if needed for 

pain.





Try carbamazepine medication to see if it decreases it over the next several 

days to a week or so.  Start with 100 mg at night for a few days and if doing 

okay with that, to increase to twice daily.





Follow-up with your primary care in the next week or so for reevaluation and 

further treatment ideas.


Discharge Date/Time: 01/19/20 02:50

## 2020-03-31 ENCOUNTER — HOSPITAL ENCOUNTER (OUTPATIENT)
Dept: HOSPITAL 76 - COV | Age: 55
Discharge: HOME | End: 2020-03-31
Attending: FAMILY MEDICINE
Payer: COMMERCIAL

## 2020-03-31 DIAGNOSIS — R05: Primary | ICD-10-CM

## 2020-03-31 PROCEDURE — 81599 UNLISTED MAAA: CPT

## 2020-06-11 ENCOUNTER — HOSPITAL ENCOUNTER (OUTPATIENT)
Dept: HOSPITAL 76 - LAB.R | Age: 55
Discharge: HOME | End: 2020-06-11
Attending: PHYSICIAN ASSISTANT
Payer: COMMERCIAL

## 2020-06-11 DIAGNOSIS — N30.90: Primary | ICD-10-CM

## 2020-06-11 PROCEDURE — 87086 URINE CULTURE/COLONY COUNT: CPT

## 2020-06-12 ENCOUNTER — HOSPITAL ENCOUNTER (OUTPATIENT)
Dept: HOSPITAL 76 - LAB | Age: 55
Discharge: HOME | End: 2020-06-12
Attending: PHYSICIAN ASSISTANT
Payer: COMMERCIAL

## 2020-06-12 DIAGNOSIS — N30.90: Primary | ICD-10-CM

## 2020-06-12 PROCEDURE — 87086 URINE CULTURE/COLONY COUNT: CPT

## 2020-07-08 ENCOUNTER — HOSPITAL ENCOUNTER (OUTPATIENT)
Dept: HOSPITAL 76 - DI | Age: 55
Discharge: HOME | End: 2020-07-08
Attending: GENERAL ACUTE CARE HOSPITAL
Payer: COMMERCIAL

## 2020-07-08 DIAGNOSIS — Z12.31: Primary | ICD-10-CM

## 2020-07-08 DIAGNOSIS — R92.1: ICD-10-CM

## 2020-07-08 PROCEDURE — 77067 SCR MAMMO BI INCL CAD: CPT

## 2020-07-08 PROCEDURE — 77063 BREAST TOMOSYNTHESIS BI: CPT

## 2020-07-16 NOTE — MAMMOGRAPHY REPORT
BILATERAL DIGITAL SCREENING MAMMOGRAM 3D/2D: 7/8/2020

CLINICAL: Routine screening.  

 

No prior exams were available for comparison.  The tissue of both breasts is heterogeneously dense. T
his may lower the sensitivity of mammography.  

 

 

There are grouped fine dystrophic heterogeneous calcifications in the left breast at 1 o'clock middle
 depth.  There is architectural distortion associated with the calcifications.  

No other significant masses, calcifications, or other findings are seen in either breast.  

 

IMPRESSION: INCOMPLETE: NEEDS ADDITIONAL IMAGING EVALUATION

The grouped fine dystrophic heterogeneous calcifications in the left breast are indeterminate.  Addit
ional views with possible ultrasound are recommended.  

 

 

 

This exam was interpreted at Station ID: [id number].  

 

NOTE: For mammograms, a report in lay terms will be sent to the patient. Approximately 15% of breast 
malignancies will not be visualized mammographically. In the management of a palpable breast mass, a 
negative mammogram must not discourage biopsy of a clinically suspicious lesion.

 

Electronically Signed By: Kenzie olivia/:7/15/2020 17:57:57  

 

 

 

ACR BI-RADS Category 0: Incomplete 3340F

PARENCHYMAL PATTERN: (D) - The breast(s) demonstrate(s) heterogeneously dense fibroglandular reji peres.

BI-RADS CATEGORY: (0) - 0

Mammo and US

[id number]

Immediate follow-up

LATERALITY: (B)

## 2020-08-05 ENCOUNTER — HOSPITAL ENCOUNTER (OUTPATIENT)
Dept: HOSPITAL 76 - LAB | Age: 55
Discharge: HOME | End: 2020-08-05
Attending: INTERNAL MEDICINE
Payer: COMMERCIAL

## 2020-08-05 DIAGNOSIS — M05.79: Primary | ICD-10-CM

## 2020-08-05 DIAGNOSIS — Z51.81: ICD-10-CM

## 2020-08-05 DIAGNOSIS — Z79.899: ICD-10-CM

## 2020-08-05 LAB
ALBUMIN DIAFP-MCNC: 3.9 G/DL (ref 3.2–5.5)
ALBUMIN/GLOB SERPL: 1.3 {RATIO} (ref 1–2.2)
ALP SERPL-CCNC: 48 IU/L (ref 42–121)
ALT SERPL W P-5'-P-CCNC: 13 IU/L (ref 10–60)
ANION GAP SERPL CALCULATED.4IONS-SCNC: 9 MMOL/L (ref 6–13)
AST SERPL W P-5'-P-CCNC: 19 IU/L (ref 10–42)
BASOPHILS NFR BLD AUTO: 0 10^3/UL (ref 0–0.1)
BASOPHILS NFR BLD AUTO: 0.4 %
BILIRUB BLD-MCNC: 0.5 MG/DL (ref 0.2–1)
BUN SERPL-MCNC: 20 MG/DL (ref 6–20)
CALCIUM UR-MCNC: 9.1 MG/DL (ref 8.5–10.3)
CHLORIDE SERPL-SCNC: 103 MMOL/L (ref 101–111)
CHOLEST SERPL-MCNC: 230 MG/DL
CO2 SERPL-SCNC: 27 MMOL/L (ref 21–32)
CREAT SERPLBLD-SCNC: 0.8 MG/DL (ref 0.4–1)
EOSINOPHIL # BLD AUTO: 0.1 10^3/UL (ref 0–0.7)
EOSINOPHIL NFR BLD AUTO: 1.4 %
ERYTHROCYTE [DISTWIDTH] IN BLOOD BY AUTOMATED COUNT: 14.5 % (ref 12–15)
GLOBULIN SER-MCNC: 2.9 G/DL (ref 2.1–4.2)
GLUCOSE SERPL-MCNC: 93 MG/DL (ref 70–100)
HDLC SERPL-MCNC: 67 MG/DL
HDLC SERPL: 3.4 {RATIO} (ref ?–4.4)
HGB UR QL STRIP: 12.4 G/DL (ref 12–16)
LDLC SERPL CALC-MCNC: 138 MG/DL
LDLC/HDLC SERPL: 2.1 {RATIO} (ref ?–4.4)
LYMPHOCYTES # SPEC AUTO: 1.5 10^3/UL (ref 1.5–3.5)
LYMPHOCYTES NFR BLD AUTO: 30.8 %
MCH RBC QN AUTO: 28.5 PG (ref 27–31)
MCHC RBC AUTO-ENTMCNC: 32.3 G/DL (ref 32–36)
MCV RBC AUTO: 88.3 FL (ref 81–99)
MONOCYTES # BLD AUTO: 0.4 10^3/UL (ref 0–1)
MONOCYTES NFR BLD AUTO: 7.2 %
NEUTROPHILS # BLD AUTO: 3 10^3/UL (ref 1.5–6.6)
NEUTROPHILS # SNV AUTO: 5 X10^3/UL (ref 4.8–10.8)
NEUTROPHILS NFR BLD AUTO: 60 %
PDW BLD AUTO: 10.3 FL (ref 7.9–10.8)
PLATELET # BLD: 207 10^3/UL (ref 130–450)
PROT SPEC-MCNC: 6.8 G/DL (ref 6.7–8.2)
RBC MAR: 4.35 10^6/UL (ref 4.2–5.4)
SODIUM SERPLBLD-SCNC: 139 MMOL/L (ref 135–145)
VLDLC SERPL-SCNC: 25 MG/DL

## 2020-08-05 PROCEDURE — 80061 LIPID PANEL: CPT

## 2020-08-05 PROCEDURE — 80053 COMPREHEN METABOLIC PANEL: CPT

## 2020-08-05 PROCEDURE — 36415 COLL VENOUS BLD VENIPUNCTURE: CPT

## 2020-08-05 PROCEDURE — 83721 ASSAY OF BLOOD LIPOPROTEIN: CPT

## 2020-08-05 PROCEDURE — 85025 COMPLETE CBC W/AUTO DIFF WBC: CPT

## 2020-09-10 ENCOUNTER — HOSPITAL ENCOUNTER (OUTPATIENT)
Dept: HOSPITAL 76 - DI | Age: 55
Discharge: HOME | End: 2020-09-10
Attending: NURSE PRACTITIONER
Payer: COMMERCIAL

## 2020-09-10 DIAGNOSIS — R92.8: Primary | ICD-10-CM

## 2020-09-10 PROCEDURE — 76642 ULTRASOUND BREAST LIMITED: CPT

## 2020-09-11 NOTE — ULTRASOUND REPORT
LIMITED ULTRASOUND OF LEFT BREAST: 9/10/2020

CLINICAL: Patient returns today to evaluate an architectural distortion in the left breast.  

 

Comparison is made to exams dated:  7/8/2020 mammogram - [facility], 8/19/2019 ultr
asound, 8/19/2019 mammogram, 2/27/2019 mammogram, and 12/18/2017 ultrasound - [facility].  

 Real-time ultrasound of the left breast 1-3 o'clock region was performed.  Gray scale images of the 
real-time examination were reviewed.  

 

No significant abnormalities were seen sonographically in the left breast.  Specifically, no finding 
to correspond to the patient's mammographic abnormality of focal calcifications. There is dense fibro
cystic tissue.

 

 

IMPRESSION: PROBABLY BENIGN 

No suspicious sonographic findings. 

The calcifications seen on mammogram have been relatively stable for one year, but two year stability
 has not been established.  A follow-up left mammogram in 6 months is recommended to demonstrate stab
ility of calcifications.   

Findings and recommendations were conveyed to the patient at time of exam. 

 

This exam was interpreted at Station ID: [id number].  

Electronically Signed By: Kenzie olivia/:9/10/2020 17:22:19  

 

 

 

Ultrasound BI-RADS: 3 Probably benign

BI-RADS CATEGORY: (3) - 3

Mammogram

[id number]

6 month follow-up

LATERALITY: (L)

## 2020-09-11 NOTE — MAMMOGRAPHY REPORT
UNILATERAL LEFT DIGITAL DIAGNOSTIC MAMMOGRAM 3D/2D: 9/10/2020

CLINICAL: Patient returns for magnifcation views of microcalcifications in the left breast.  

 

Comparison is made to exams dated:  7/8/2020 mammogram - [facility], 8/19/2019 ultr
asound, 8/19/2019 mammogram, 2/27/2019 mammogram, and 12/18/2017 ultrasound - [facility].  The tissue of left breast is heterogeneously dense. This may lower the sensitivity of ma
mmography.  

 

There are grouped fine dystrophic heterogeneous calcifications in the left breast at 1 o'clock middle
 depth.  These are not significantly changed compared to previous exams, but better seen in magnifica
tion views.  

No other significant masses or calcifications are seen in the breast.  

 

IMPRESSION: INCOMPLETE: NEEDS ADDITIONAL IMAGING EVALUATION

 

The grouped fine dystrophic heterogeneous calcifications in the left breast at 1 o'clock middle depth
 remain indeterminate.  An ultrasound is recommended to detect underlying masses. This was performed 
immediately following this exam.  

 

 

This exam was interpreted at Station ID: [id number].  

 

NOTE: For mammograms, a report in lay terms will be sent to the patient. Approximately 15% of breast 
malignancies will not be visualized mammographically. In the management of a palpable breast mass, a 
negative mammogram must not discourage biopsy of a clinically suspicious lesion.

 

Electronically Signed By: Kenzie olivia/:9/10/2020 15:41:30  

 

 

 

ACR BI-RADS Category 0: Incomplete 3340F

PARENCHYMAL PATTERN: (D) - The breast(s) demonstrate(s) heterogeneously dense fibroglandular parenchy
ma.

BI-RADS CATEGORY: (0) - 0

Ultrasound

[id number]

Immediate follow-up

LATERALITY: (B)
details…

## 2020-09-27 ENCOUNTER — HOSPITAL ENCOUNTER (EMERGENCY)
Dept: HOSPITAL 76 - ED | Age: 55
Discharge: HOME | End: 2020-09-27
Payer: COMMERCIAL

## 2020-09-27 VITALS — DIASTOLIC BLOOD PRESSURE: 76 MMHG | SYSTOLIC BLOOD PRESSURE: 136 MMHG

## 2020-09-27 DIAGNOSIS — E86.0: ICD-10-CM

## 2020-09-27 DIAGNOSIS — Z90.49: ICD-10-CM

## 2020-09-27 DIAGNOSIS — K44.9: ICD-10-CM

## 2020-09-27 DIAGNOSIS — R79.89: ICD-10-CM

## 2020-09-27 DIAGNOSIS — R07.89: Primary | ICD-10-CM

## 2020-09-27 DIAGNOSIS — I10: ICD-10-CM

## 2020-09-27 LAB
ALBUMIN DIAFP-MCNC: 4.2 G/DL (ref 3.2–5.5)
ALBUMIN/GLOB SERPL: 1.5 {RATIO} (ref 1–2.2)
ALP SERPL-CCNC: 45 IU/L (ref 42–121)
ALT SERPL W P-5'-P-CCNC: 12 IU/L (ref 10–60)
ANION GAP SERPL CALCULATED.4IONS-SCNC: 9 MMOL/L (ref 6–13)
AST SERPL W P-5'-P-CCNC: 19 IU/L (ref 10–42)
BASOPHILS NFR BLD AUTO: 0 10^3/UL (ref 0–0.1)
BASOPHILS NFR BLD AUTO: 0.4 %
BILIRUB BLD-MCNC: 0.5 MG/DL (ref 0.2–1)
BUN SERPL-MCNC: 22 MG/DL (ref 6–20)
CALCIUM UR-MCNC: 9.6 MG/DL (ref 8.5–10.3)
CHLORIDE SERPL-SCNC: 104 MMOL/L (ref 101–111)
CO2 SERPL-SCNC: 29 MMOL/L (ref 21–32)
CREAT SERPLBLD-SCNC: 0.9 MG/DL (ref 0.4–1)
EOSINOPHIL # BLD AUTO: 0.1 10^3/UL (ref 0–0.7)
EOSINOPHIL NFR BLD AUTO: 1.6 %
ERYTHROCYTE [DISTWIDTH] IN BLOOD BY AUTOMATED COUNT: 13.7 % (ref 12–15)
GLOBULIN SER-MCNC: 2.8 G/DL (ref 2.1–4.2)
GLUCOSE SERPL-MCNC: 117 MG/DL (ref 70–100)
HGB UR QL STRIP: 13.1 G/DL (ref 12–16)
LIPASE SERPL-CCNC: 51 U/L (ref 22–51)
LYMPHOCYTES # SPEC AUTO: 2.3 10^3/UL (ref 1.5–3.5)
LYMPHOCYTES NFR BLD AUTO: 46.6 %
MCH RBC QN AUTO: 29.1 PG (ref 27–31)
MCHC RBC AUTO-ENTMCNC: 32.9 G/DL (ref 32–36)
MCV RBC AUTO: 88.4 FL (ref 81–99)
MONOCYTES # BLD AUTO: 0.3 10^3/UL (ref 0–1)
MONOCYTES NFR BLD AUTO: 5.7 %
NEUTROPHILS # BLD AUTO: 2.2 10^3/UL (ref 1.5–6.6)
NEUTROPHILS # SNV AUTO: 4.9 X10^3/UL (ref 4.8–10.8)
NEUTROPHILS NFR BLD AUTO: 45.5 %
PDW BLD AUTO: 10.4 FL (ref 7.9–10.8)
PLATELET # BLD: 209 10^3/UL (ref 130–450)
PROT SPEC-MCNC: 7 G/DL (ref 6.7–8.2)
RBC MAR: 4.5 10^6/UL (ref 4.2–5.4)
SODIUM SERPLBLD-SCNC: 142 MMOL/L (ref 135–145)

## 2020-09-27 PROCEDURE — 84443 ASSAY THYROID STIM HORMONE: CPT

## 2020-09-27 PROCEDURE — 85379 FIBRIN DEGRADATION QUANT: CPT

## 2020-09-27 PROCEDURE — 36415 COLL VENOUS BLD VENIPUNCTURE: CPT

## 2020-09-27 PROCEDURE — 83880 ASSAY OF NATRIURETIC PEPTIDE: CPT

## 2020-09-27 PROCEDURE — 96361 HYDRATE IV INFUSION ADD-ON: CPT

## 2020-09-27 PROCEDURE — 71275 CT ANGIOGRAPHY CHEST: CPT

## 2020-09-27 PROCEDURE — 99284 EMERGENCY DEPT VISIT MOD MDM: CPT

## 2020-09-27 PROCEDURE — 84484 ASSAY OF TROPONIN QUANT: CPT

## 2020-09-27 PROCEDURE — 93005 ELECTROCARDIOGRAM TRACING: CPT

## 2020-09-27 PROCEDURE — 71045 X-RAY EXAM CHEST 1 VIEW: CPT

## 2020-09-27 PROCEDURE — 85025 COMPLETE CBC W/AUTO DIFF WBC: CPT

## 2020-09-27 PROCEDURE — 80053 COMPREHEN METABOLIC PANEL: CPT

## 2020-09-27 PROCEDURE — 96374 THER/PROPH/DIAG INJ IV PUSH: CPT

## 2020-09-27 PROCEDURE — 83690 ASSAY OF LIPASE: CPT

## 2020-09-27 NOTE — CT REPORT
PROCEDURE:  ANGIO CHEST W/WO

 

INDICATIONS:  PE study

 

CONTRAST:  IV CONTRAST: Optiray 320 ml: 80 PO CONTRAST: *NO PO CONTRAST 

 

TECHNIQUE:  

After the administration of intravenous contrast, 2 mm thick sections acquired from the pulmonary api
abigail to the posterior costophrenic angles.  3-dimensional maximum intensity projection (MIP) coronal a
nd sagittal reformats were then acquired through the thorax. For radiation dose reduction, the follow
ing was used:  automated exposure control, adjustment of mA and/or kV according to patient size. 

 

COMPARISON:  Prior chest CT, 5/31/2018. Correlation is made with the accompanying chest x-ray, 9/27/2
020

 

FINDINGS:  

Image quality:  Excellent.  

 

Pulmonary arteries:  Pulmonary arteries are normal in size, and demonstrate no intraluminal filling d
efects to suggest central pulmonary embolism.  

 

Lungs and pleura:  Lungs are clear.  No pleural effusions or pneumothorax.  Central and peripheral ai
rways are patent.  

 

Mediastinum:  Heart size is normal, without pericardial effusion.  No mediastinal or hilar adenopathy
.  Thoracic aorta is normal in caliber and enhancement.  Esophagus is normal in caliber. There is a s
mall hiatal hernia.  

 

Bones and chest wall:  No suspicious bony lesions.  Ribs and thoracic spine appear intact throughout.
  The thyroid is normal.  No axillary or supraclavicular adenopathy.  

 

Abdomen:  Cholecystectomy clips are seen.   Visualized upper abdominal solid organs appear normal in 
the early arterial phase of enhancement.  

 

 

 

 

 

IMPRESSION:  

 

Negative for pulmonary embolism.

 

Clear lungs.

 

 

 

Incidental note is made of:

Small hiatal hernia

Cholecystectomy

 

Reviewed by: Jose Kelley MD on 9/27/2020 3:47 PM AKDT

Approved by: Jose Kelley MD on 9/27/2020 3:47 PM AKDT

 

 

Station ID:  [id number]

## 2020-09-27 NOTE — ED PHYSICIAN DOCUMENTATION
PD HPI CHEST PAIN





- Stated complaint


Stated Complaint: CHEST PRESSURE





- Chief complaint


Chief Complaint: Cardiac





- History obtained from


History obtained from: Patient





- History of Present Illness


Timing - onset: How many days ago (10)


Timing - onset during: Rest


Timing - duration: Days (10)


Timing - details: Abrupt onset, Still present


Quality: Pressure


Location: Substernal, Left chest


Improved by: Other (sitting up or laying on the right side)


Worsened by: Exertion, Position.  No: Inspiration, Movement, Palpation


Associated symptoms: Shortness of air.  No: Diaphoresis, Nausea, Vomiting, 

Feeling faint / dizzy, General Weakness, Palpitations


Similar symptoms before: Has not had sx before


Recently seen: Clinic





- Additional information


Additional information: 





55-year-old female complains of a 10-day history of pressure into the left chest

that started with 2 sharp pains under the left breast and then developed 

pressure.  She denies any specific pain she denies any worsening of the pain 

with inspiration.  She does have an increase in pain if she is laying flat and 

she is able to roll onto her right side with relief of pain or sit up.  She also

is complaining of some exertional dyspnea.  She states she can walk about across

her house and will become short of breath.  She is able to get out of bed into 

the bathroom without developing shortness of breath. She has had prior normal 

stress echo done at the [facility] 2 years ago.





Review of Systems


Constitutional: denies: Fever


Eyes: denies: Decreased vision


Ears: denies: Ear pain


Nose: denies: Rhinorrhea / runny nose, Congestion


Throat: denies: Sore throat


Cardiac: reports: Chest pain / pressure, Palpitations.  denies: Pedal edema, 

Calf pain


Respiratory: reports: Dyspnea.  denies: Cough, Wheezing


GI: denies: Abdominal Pain, Nausea, Vomiting


: denies: Dysuria, Frequency





PD PAST MEDICAL HISTORY





- Past Medical History


Cardiovascular: None, Hypertension, Other


Respiratory: None


Neuro: Migraines


Endocrine/Autoimmune: None


GI: GERD


: None


HEENT: None


Psych: None


Musculoskeletal: Rheumatoid arthritis


Derm: None





- Past Surgical History


Past Surgical History: Yes


General: Cholecystectomy, Appendectomy


Ortho: Hip replacement, Other


/GYN: Hysterectomy


HEENT: Tonsil/Adenoidectomy





- Present Medications


Home Medications: 


                                Ambulatory Orders











 Medication  Instructions  Recorded  Confirmed


 


Gabapentin 600 mg PO QID 03/13/17 09/05/17


 


atenoloL [Atenolol] 25 mg PO BID 03/13/17 09/05/17


 


Abatacept [Orencia] mg SQ 05/31/18 


 


Leflunomide [Arava] mg PO 05/31/18 


 


Omeprazole [PriLOSEC] 20 mg PO DAILY 06/21/18 06/21/18


 


Gabapentin 300 mg PO TID 01/19/20 01/19/20


 


HYDROmorphone [Dilaudid] 2 mg PO Q6HR PRN 01/19/20 01/19/20


 


Hydrocodone/Acetaminophen [Norco 1 each PO Q6H PRN #20 tablet 01/19/20 





5-325 Tablet]   


 


Ondansetron Odt [Zofran] 4 mg TL Q6H PRN #15 tablet 01/19/20 


 


SUMAtriptan succinate [Sumatriptan 50 mg PO DAILY PRN #10 tablet 01/19/20 





Succinate]   


 


carBAMazepine [TEGretol] 100 mg PO BID #30 tablet 01/19/20 


 


dexAMETHasone [Decadron] 4 mg PO DAILY #7 tablet 01/19/20 


 


Hydrocodone/Acetaminophen 1 - 2 each PO Q6H PRN #14 tablet 09/27/20 





[Hydrocodone-Acetamin 5-325 mg]   














- Allergies


Allergies/Adverse Reactions: 


                                    Allergies











Allergy/AdvReac Type Severity Reaction Status Date / Time


 


codeine Allergy  Unknown Verified 09/27/20 13:25














- Social History


Does the pt smoke?: No


Smoking Status: Never smoker


Does the pt drink ETOH?: No


Does the pt have substance abuse?: No





- Immunizations


Immunizations are current?: Yes





- POLST


Patient has POLST: No





PD ED PE NORMAL





- Vitals


Vital signs reviewed: Yes (hypertensive )





- General


General: Alert and oriented X 3, No acute distress, Well developed/nourished





- HEENT


HEENT: Atraumatic, PERRL, EOMI





- Neck


Neck: Supple, no meningeal sign, No bony TTP





- Cardiac


Cardiac: RRR, No murmur





- Respiratory


Respiratory: No respiratory distress, Clear bilaterally





- Abdomen


Abdomen: Normal bowel sounds, Soft, Non tender, Non distended, No organomegaly





- Back


Back: No CVA TTP, No spinal TTP





- Derm


Derm: Normal color, Warm and dry, No rash





- Extremities


Extremities: No deformity, No edema





- Neuro


Neuro: Alert and oriented X 3, CNs 2-12 intact, No motor deficit, No sensory 

deficit, Normal speech


Eye Opening: Spontaneous


Motor: Obeys Commands


Verbal: Oriented


GCS Score: 15





- Psych


Psych: Normal mood, Normal affect





Results





- Vitals


Vitals: 


                               Vital Signs - 24 hr











  09/27/20 09/27/20 09/27/20





  13:21 14:17 14:34


 


Temperature 36.4 C L  


 


Heart Rate 78 73 69


 


Respiratory 12 17 13





Rate   


 


Blood Pressure 128/93 H 136/83 H 117/67


 


O2 Saturation 100 100 100














  09/27/20





  16:29


 


Temperature 


 


Heart Rate 77


 


Respiratory 19





Rate 


 


Blood Pressure 120/72


 


O2 Saturation 100








                                     Oxygen











O2 Source                      Room air

















- EKG (time done)


  ** 1319


Rate: Rate (enter#) (74)


Rhythm: NSR


Ischemia: Normal ST segments


Compare to prior EKG: Unchanged from prior EKG (SPT 5- no changes)


Computer interpretation: Agree with computer





- Labs


Labs: 


                                Laboratory Tests











  09/27/20 09/27/20 09/27/20





  13:55 13:55 13:55


 


WBC  4.9  


 


RBC  4.50  


 


Hgb  13.1  


 


Hct  39.8  


 


MCV  88.4  


 


MCH  29.1  


 


MCHC  32.9  


 


RDW  13.7  


 


Plt Count  209  


 


MPV  10.4  


 


Neut # (Auto)  2.2  


 


Lymph # (Auto)  2.3  


 


Mono # (Auto)  0.3  


 


Eos # (Auto)  0.1  


 


Baso # (Auto)  0.0  


 


Absolute Nucleated RBC  0.00  


 


Nucleated RBC %  0.0  


 


D-Dimer   


 


Sodium   142 


 


Potassium   4.0 


 


Chloride   104 


 


Carbon Dioxide   29 


 


Anion Gap   9.0 


 


BUN   22 H 


 


Creatinine   0.9 


 


Estimated GFR (MDRD)   65 L 


 


Glucose   117 H 


 


Calcium   9.6 


 


Total Bilirubin   0.5 


 


AST   19 


 


ALT   12 


 


Alkaline Phosphatase   45 


 


Troponin I High Sens    < 2.3 L


 


B-Natriuretic Peptide   


 


Total Protein   7.0 


 


Albumin   4.2 


 


Globulin   2.8 


 


Albumin/Globulin Ratio   1.5 


 


Lipase   51 


 


TSH   














  09/27/20 09/27/20 09/27/20





  13:55 13:55 13:55


 


WBC   


 


RBC   


 


Hgb   


 


Hct   


 


MCV   


 


MCH   


 


MCHC   


 


RDW   


 


Plt Count   


 


MPV   


 


Neut # (Auto)   


 


Lymph # (Auto)   


 


Mono # (Auto)   


 


Eos # (Auto)   


 


Baso # (Auto)   


 


Absolute Nucleated RBC   


 


Nucleated RBC %   


 


D-Dimer    612.7 H


 


Sodium   


 


Potassium   


 


Chloride   


 


Carbon Dioxide   


 


Anion Gap   


 


BUN   


 


Creatinine   


 


Estimated GFR (MDRD)   


 


Glucose   


 


Calcium   


 


Total Bilirubin   


 


AST   


 


ALT   


 


Alkaline Phosphatase   


 


Troponin I High Sens   


 


B-Natriuretic Peptide  42  


 


Total Protein   


 


Albumin   


 


Globulin   


 


Albumin/Globulin Ratio   


 


Lipase   


 


TSH   0.56 














- Rads (name of study)


  ** chest


Radiology: Prelim report reviewed (Impression: Normal portable chest.  Clear 

lungs.  Levoconvex scoliotic curvature.), EMP read indepedently, See rad report





  ** CT angios chest


Radiology: Prelim report reviewed (Impression: Negative for pulmonary embolism. 

Clear lungs.  Incidental note made of: Small hiatal hernia cholecystectomy), EMP

read indepedently, See rad report





Procedures





- IVC sono (time)


  ** 1420


Bedside IVC sono: IVC measures (cm) (1.22), IVC collapsed c insp (cm) 

(complete), Dehydration (est 1 liter deficit)





PD MEDICAL DECISION MAKING





- ED course


Complexity details: reviewed old records, reviewed results, re-evaluated 

patient, considered differential, d/w patient


ED course: 





54 y/o female with a 10 day history of exertional dyspnea and left sided chest 

pain. Her vitals here are normal, her CXR, EKG, trop and BNP are all normal. Her

D-dimer is elevated and a pulmonary angiogram is ordered. She is found to have a

completely collapsing IVC consistent with a 1 liter deficit and she is 

administered IV saline. The pulmonary angiogram is without obvious PE and she is

administered IV decadron and toradal for chest wall pain. 





Departure





- Departure


Disposition: 01 Home, Self Care


Clinical Impression: 


 Chest wall pain





Condition: Stable


Instructions:  ED Chest Pain Atypical Unkn Cause


Follow-Up: 


Edel Zamora ARNP [Primary Care Provider] - 


Prescriptions: 


Hydrocodone/Acetaminophen [Hydrocodone-Acetamin 5-325 mg] 1 - 2 each PO Q6H PRN 

#14 tablet


 PRN Reason: Pain

## 2020-09-27 NOTE — XRAY REPORT
PROCEDURE:  Chest 1 View X-Ray

 

INDICATIONS:  Chest pain

 

TECHNIQUE:  One view of the chest was acquired.  

 

COMPARISON:  5/31/2018 chest x-ray and chest CT

 

FINDINGS:  

 

Surgical changes and devices:  None.  

 

Lungs and pleura:  No pleural effusions or pneumothorax.  Lungs are clear.  

 

Mediastinum:  Mediastinal contours appear normal.  Heart size is normal.  

 

Bones and chest wall:  No suspicious bony lesions.  Mild levoconvex scoliotic curvature is seen.   Ov
erlying soft tissues appear unremarkable.  

 

 

 

IMPRESSION:  

 

Normal portable chest. Clear lungs.

 

Levoconvex scoliotic curvature.

 

Reviewed by: Jose Kelley MD on 9/27/2020 1:21 PM AKDT

Approved by: Jose Kelley MD on 9/27/2020 1:21 PM AKDT

 

 

Station ID:  [id number]

## 2020-10-02 ENCOUNTER — HOSPITAL ENCOUNTER (OUTPATIENT)
Dept: HOSPITAL 76 - LAB | Age: 55
Discharge: HOME | End: 2020-10-02
Attending: INTERNAL MEDICINE
Payer: COMMERCIAL

## 2020-10-02 DIAGNOSIS — Z51.81: ICD-10-CM

## 2020-10-02 DIAGNOSIS — M05.79: Primary | ICD-10-CM

## 2020-10-02 LAB
ALBUMIN DIAFP-MCNC: 4.1 G/DL (ref 3.2–5.5)
ALBUMIN/GLOB SERPL: 1.5 {RATIO} (ref 1–2.2)
ALP SERPL-CCNC: 45 IU/L (ref 42–121)
ALT SERPL W P-5'-P-CCNC: 13 IU/L (ref 10–60)
ANION GAP SERPL CALCULATED.4IONS-SCNC: 9 MMOL/L (ref 6–13)
AST SERPL W P-5'-P-CCNC: 17 IU/L (ref 10–42)
BASOPHILS NFR BLD AUTO: 0 10^3/UL (ref 0–0.1)
BASOPHILS NFR BLD AUTO: 0.6 %
BILIRUB BLD-MCNC: 0.6 MG/DL (ref 0.2–1)
BUN SERPL-MCNC: 25 MG/DL (ref 6–20)
CALCIUM UR-MCNC: 9.1 MG/DL (ref 8.5–10.3)
CHLORIDE SERPL-SCNC: 101 MMOL/L (ref 101–111)
CO2 SERPL-SCNC: 27 MMOL/L (ref 21–32)
CREAT SERPLBLD-SCNC: 0.9 MG/DL (ref 0.4–1)
EOSINOPHIL # BLD AUTO: 0.1 10^3/UL (ref 0–0.7)
EOSINOPHIL NFR BLD AUTO: 2.4 %
ERYTHROCYTE [DISTWIDTH] IN BLOOD BY AUTOMATED COUNT: 13.9 % (ref 12–15)
GLOBULIN SER-MCNC: 2.7 G/DL (ref 2.1–4.2)
GLUCOSE SERPL-MCNC: 129 MG/DL (ref 70–100)
HGB UR QL STRIP: 12.5 G/DL (ref 12–16)
LYMPHOCYTES # SPEC AUTO: 2.1 10^3/UL (ref 1.5–3.5)
LYMPHOCYTES NFR BLD AUTO: 40.7 %
MCH RBC QN AUTO: 28.3 PG (ref 27–31)
MCHC RBC AUTO-ENTMCNC: 31.5 G/DL (ref 32–36)
MCV RBC AUTO: 89.8 FL (ref 81–99)
MONOCYTES # BLD AUTO: 0.3 10^3/UL (ref 0–1)
MONOCYTES NFR BLD AUTO: 6.3 %
NEUTROPHILS # BLD AUTO: 2.5 10^3/UL (ref 1.5–6.6)
NEUTROPHILS # SNV AUTO: 5.1 X10^3/UL (ref 4.8–10.8)
NEUTROPHILS NFR BLD AUTO: 49.8 %
PDW BLD AUTO: 10.3 FL (ref 7.9–10.8)
PLATELET # BLD: 216 10^3/UL (ref 130–450)
PROT SPEC-MCNC: 6.8 G/DL (ref 6.7–8.2)
RBC MAR: 4.42 10^6/UL (ref 4.2–5.4)
SODIUM SERPLBLD-SCNC: 137 MMOL/L (ref 135–145)

## 2020-10-02 PROCEDURE — 85025 COMPLETE CBC W/AUTO DIFF WBC: CPT

## 2020-10-02 PROCEDURE — 36415 COLL VENOUS BLD VENIPUNCTURE: CPT

## 2020-10-02 PROCEDURE — 80053 COMPREHEN METABOLIC PANEL: CPT

## 2020-10-30 ENCOUNTER — HOSPITAL ENCOUNTER (OUTPATIENT)
Dept: HOSPITAL 76 - DI | Age: 55
Discharge: HOME | End: 2020-10-30
Attending: NURSE PRACTITIONER
Payer: COMMERCIAL

## 2020-10-30 DIAGNOSIS — R92.8: Primary | ICD-10-CM

## 2020-10-30 PROCEDURE — 76642 ULTRASOUND BREAST LIMITED: CPT

## 2020-11-03 NOTE — ULTRASOUND REPORT
LIMITED ULTRASOUND OF LEFT BREAST: 10/30/2020

CLINICAL: Diffuse left breast pain.  

 

Comparison is made to exams dated:  9/10/2020 ultrasound, 9/10/2020 mammogram - [facility], 12/18/2017 ultrasound, 8/19/2019 mammogram, and 2/27/2019 mammogram - [facility].  

 

Color flow ultrasound of the left breast 1-2 o'clock, and retroareolar regions was performed on the a
reas of interest.  Gray scale images of the real-time examination were reviewed.  

 

No discrete cystic or solid mass lesion identified in the area of pain and tenderness. Scattered foci
 of calcifications are noted.

 

IMPRESSION: SUSPICIOUS OF MALIGNANCY 

There is no discrete mass in the left breast to correspond with the pain and tenderness at 1 and 2 o'
clock. 

 

Grouped microcalcifications were demonstrated on the prior available mammograms from 2019 and 2020 in
 the upper outer quadrant possible corresponding to the areas of clinical concern. Patient reports pr
ior mammogram performed in [place]. Attempts will be made to obtain those exams for comparison. If the
 calcifications are new from prior imaging, a stereotactic guided biopsy is recommended. If the calci
fications were present previously, clinical followup is recommended for pain symptoms. Consider a alex
ast MRI if clinical concern persists. 

 

The findings were discussed with and the results were reviewed with the patient at the conclusion of 
the study.   

 

Future imaging is recommended as follows: 03/10/2021 follow-up left mammogram.  

 

 

 

This exam was interpreted at Station ID: [id number].  

Electronically Signed By: Ernesto Almazan M.D.  

ddp/:10/30/2020 15:10:30  

 

 

 

Ultrasound BI-RADS: 4 Suspicious for malignancy

BI-RADS CATEGORY: (4) - 4

None

[id number]

Immediate follow-up

LATERALITY: ()

## 2020-11-05 ENCOUNTER — HOSPITAL ENCOUNTER (OUTPATIENT)
Dept: HOSPITAL 76 - LAB | Age: 55
Discharge: HOME | End: 2020-11-05
Attending: INTERNAL MEDICINE
Payer: COMMERCIAL

## 2020-11-05 DIAGNOSIS — Z79.899: ICD-10-CM

## 2020-11-05 DIAGNOSIS — Z51.81: ICD-10-CM

## 2020-11-05 DIAGNOSIS — M05.79: Primary | ICD-10-CM

## 2020-11-05 LAB
ALBUMIN DIAFP-MCNC: 4.4 G/DL (ref 3.2–5.5)
ALBUMIN/GLOB SERPL: 1.6 {RATIO} (ref 1–2.2)
ALP SERPL-CCNC: 43 IU/L (ref 42–121)
ALT SERPL W P-5'-P-CCNC: 13 IU/L (ref 10–60)
ANION GAP SERPL CALCULATED.4IONS-SCNC: 11 MMOL/L (ref 6–13)
AST SERPL W P-5'-P-CCNC: 20 IU/L (ref 10–42)
BASOPHILS NFR BLD AUTO: 0 10^3/UL (ref 0–0.1)
BASOPHILS NFR BLD AUTO: 0.2 %
BILIRUB BLD-MCNC: 0.7 MG/DL (ref 0.2–1)
BUN SERPL-MCNC: 18 MG/DL (ref 6–20)
CALCIUM UR-MCNC: 9.9 MG/DL (ref 8.5–10.3)
CHLORIDE SERPL-SCNC: 106 MMOL/L (ref 101–111)
CO2 SERPL-SCNC: 26 MMOL/L (ref 21–32)
CREAT SERPLBLD-SCNC: 0.9 MG/DL (ref 0.4–1)
EOSINOPHIL # BLD AUTO: 0.2 10^3/UL (ref 0–0.7)
EOSINOPHIL NFR BLD AUTO: 3.6 %
ERYTHROCYTE [DISTWIDTH] IN BLOOD BY AUTOMATED COUNT: 13.9 % (ref 12–15)
GLOBULIN SER-MCNC: 2.7 G/DL (ref 2.1–4.2)
GLUCOSE SERPL-MCNC: 93 MG/DL (ref 70–100)
HGB UR QL STRIP: 12.8 G/DL (ref 12–16)
LYMPHOCYTES # SPEC AUTO: 1.7 10^3/UL (ref 1.5–3.5)
LYMPHOCYTES NFR BLD AUTO: 37 %
MCH RBC QN AUTO: 29.2 PG (ref 27–31)
MCHC RBC AUTO-ENTMCNC: 32.5 G/DL (ref 32–36)
MCV RBC AUTO: 90 FL (ref 81–99)
MONOCYTES # BLD AUTO: 0.4 10^3/UL (ref 0–1)
MONOCYTES NFR BLD AUTO: 8.5 %
NEUTROPHILS # BLD AUTO: 2.3 10^3/UL (ref 1.5–6.6)
NEUTROPHILS # SNV AUTO: 4.5 X10^3/UL (ref 4.8–10.8)
NEUTROPHILS NFR BLD AUTO: 50.5 %
PDW BLD AUTO: 10.6 FL (ref 7.9–10.8)
PLATELET # BLD: 218 10^3/UL (ref 130–450)
PROT SPEC-MCNC: 7.1 G/DL (ref 6.7–8.2)
RBC MAR: 4.38 10^6/UL (ref 4.2–5.4)
SODIUM SERPLBLD-SCNC: 143 MMOL/L (ref 135–145)

## 2020-11-05 PROCEDURE — 36415 COLL VENOUS BLD VENIPUNCTURE: CPT

## 2020-11-05 PROCEDURE — 80053 COMPREHEN METABOLIC PANEL: CPT

## 2020-11-05 PROCEDURE — 85025 COMPLETE CBC W/AUTO DIFF WBC: CPT

## 2020-11-18 ENCOUNTER — HOSPITAL ENCOUNTER (OUTPATIENT)
Dept: HOSPITAL 76 - LAB.S | Age: 55
Discharge: HOME | End: 2020-11-18
Attending: FAMILY MEDICINE
Payer: COMMERCIAL

## 2020-11-18 DIAGNOSIS — D64.9: Primary | ICD-10-CM

## 2020-11-18 DIAGNOSIS — R53.83: ICD-10-CM

## 2020-11-18 DIAGNOSIS — K21.9: ICD-10-CM

## 2020-11-18 DIAGNOSIS — N64.4: ICD-10-CM

## 2020-11-18 LAB
ALBUMIN DIAFP-MCNC: 4 G/DL (ref 3.2–5.5)
ALBUMIN/GLOB SERPL: 1.3 {RATIO} (ref 1–2.2)
ALP SERPL-CCNC: 94 IU/L (ref 42–121)
ALT SERPL W P-5'-P-CCNC: 149 IU/L (ref 10–60)
AMYLASE SERPL-CCNC: 46 U/L (ref 28–100)
ANION GAP SERPL CALCULATED.4IONS-SCNC: 7 MMOL/L (ref 6–13)
AST SERPL W P-5'-P-CCNC: 175 IU/L (ref 10–42)
BASOPHILS NFR BLD AUTO: 0 10^3/UL (ref 0–0.1)
BASOPHILS NFR BLD AUTO: 0.5 %
BILIRUB BLD-MCNC: 0.6 MG/DL (ref 0.2–1)
BUN SERPL-MCNC: 12 MG/DL (ref 6–20)
CALCIUM UR-MCNC: 9.7 MG/DL (ref 8.5–10.3)
CHLORIDE SERPL-SCNC: 102 MMOL/L (ref 101–111)
CO2 SERPL-SCNC: 31 MMOL/L (ref 21–32)
CREAT SERPLBLD-SCNC: 0.9 MG/DL (ref 0.4–1)
EOSINOPHIL # BLD AUTO: 0.3 10^3/UL (ref 0–0.7)
EOSINOPHIL NFR BLD AUTO: 6.9 %
ERYTHROCYTE [DISTWIDTH] IN BLOOD BY AUTOMATED COUNT: 13.5 % (ref 12–15)
GLOBULIN SER-MCNC: 3 G/DL (ref 2.1–4.2)
GLUCOSE SERPL-MCNC: 85 MG/DL (ref 70–100)
HGB UR QL STRIP: 13.1 G/DL (ref 12–16)
LIPASE SERPL-CCNC: 33 U/L (ref 22–51)
LYMPHOCYTES # SPEC AUTO: 1.7 10^3/UL (ref 1.5–3.5)
LYMPHOCYTES NFR BLD AUTO: 44.3 %
MCH RBC QN AUTO: 29 PG (ref 27–31)
MCHC RBC AUTO-ENTMCNC: 32.4 G/DL (ref 32–36)
MCV RBC AUTO: 89.6 FL (ref 81–99)
MONOCYTES # BLD AUTO: 0.4 10^3/UL (ref 0–1)
MONOCYTES NFR BLD AUTO: 10.1 %
NEUTROPHILS # BLD AUTO: 1.4 10^3/UL (ref 1.5–6.6)
NEUTROPHILS # SNV AUTO: 3.8 X10^3/UL (ref 4.8–10.8)
NEUTROPHILS NFR BLD AUTO: 38.2 %
PDW BLD AUTO: 11 FL (ref 7.9–10.8)
PLATELET # BLD: 225 10^3/UL (ref 130–450)
PROT SPEC-MCNC: 7 G/DL (ref 6.7–8.2)
RBC MAR: 4.51 10^6/UL (ref 4.2–5.4)
SODIUM SERPLBLD-SCNC: 140 MMOL/L (ref 135–145)

## 2020-11-18 PROCEDURE — 85025 COMPLETE CBC W/AUTO DIFF WBC: CPT

## 2020-11-18 PROCEDURE — 83690 ASSAY OF LIPASE: CPT

## 2020-11-18 PROCEDURE — 82150 ASSAY OF AMYLASE: CPT

## 2020-11-18 PROCEDURE — 36415 COLL VENOUS BLD VENIPUNCTURE: CPT

## 2020-11-18 PROCEDURE — 80053 COMPREHEN METABOLIC PANEL: CPT

## 2020-11-18 PROCEDURE — 84443 ASSAY THYROID STIM HORMONE: CPT

## 2020-12-02 ENCOUNTER — HOSPITAL ENCOUNTER (OUTPATIENT)
Dept: HOSPITAL 76 - LAB | Age: 55
Discharge: HOME | End: 2020-12-02
Attending: INTERNAL MEDICINE
Payer: COMMERCIAL

## 2020-12-02 DIAGNOSIS — M05.79: Primary | ICD-10-CM

## 2020-12-02 DIAGNOSIS — Z51.81: ICD-10-CM

## 2020-12-02 LAB
ALBUMIN DIAFP-MCNC: 4.1 G/DL (ref 3.2–5.5)
ALBUMIN/GLOB SERPL: 1.6 {RATIO} (ref 1–2.2)
ALP SERPL-CCNC: 48 IU/L (ref 42–121)
ALT SERPL W P-5'-P-CCNC: 15 IU/L (ref 10–60)
ANION GAP SERPL CALCULATED.4IONS-SCNC: 9 MMOL/L (ref 6–13)
AST SERPL W P-5'-P-CCNC: 16 IU/L (ref 10–42)
BASOPHILS NFR BLD AUTO: 0 10^3/UL (ref 0–0.1)
BASOPHILS NFR BLD AUTO: 0.4 %
BILIRUB BLD-MCNC: 0.4 MG/DL (ref 0.2–1)
BUN SERPL-MCNC: 19 MG/DL (ref 6–20)
CALCIUM UR-MCNC: 10 MG/DL (ref 8.5–10.3)
CHLORIDE SERPL-SCNC: 102 MMOL/L (ref 101–111)
CO2 SERPL-SCNC: 28 MMOL/L (ref 21–32)
CREAT SERPLBLD-SCNC: 1 MG/DL (ref 0.4–1)
EOSINOPHIL # BLD AUTO: 0.2 10^3/UL (ref 0–0.7)
EOSINOPHIL NFR BLD AUTO: 4.1 %
ERYTHROCYTE [DISTWIDTH] IN BLOOD BY AUTOMATED COUNT: 13.6 % (ref 12–15)
GLOBULIN SER-MCNC: 2.6 G/DL (ref 2.1–4.2)
GLUCOSE SERPL-MCNC: 103 MG/DL (ref 70–100)
HGB UR QL STRIP: 12.6 G/DL (ref 12–16)
LYMPHOCYTES # SPEC AUTO: 2.2 10^3/UL (ref 1.5–3.5)
LYMPHOCYTES NFR BLD AUTO: 40.2 %
MCH RBC QN AUTO: 29.3 PG (ref 27–31)
MCHC RBC AUTO-ENTMCNC: 32.8 G/DL (ref 32–36)
MCV RBC AUTO: 89.3 FL (ref 81–99)
MONOCYTES # BLD AUTO: 0.5 10^3/UL (ref 0–1)
MONOCYTES NFR BLD AUTO: 9.7 %
NEUTROPHILS # BLD AUTO: 2.5 10^3/UL (ref 1.5–6.6)
NEUTROPHILS # SNV AUTO: 5.6 X10^3/UL (ref 4.8–10.8)
NEUTROPHILS NFR BLD AUTO: 45.4 %
PDW BLD AUTO: 10.3 FL (ref 7.9–10.8)
PLATELET # BLD: 264 10^3/UL (ref 130–450)
PROT SPEC-MCNC: 6.7 G/DL (ref 6.7–8.2)
RBC MAR: 4.3 10^6/UL (ref 4.2–5.4)
SODIUM SERPLBLD-SCNC: 139 MMOL/L (ref 135–145)

## 2020-12-02 PROCEDURE — 36415 COLL VENOUS BLD VENIPUNCTURE: CPT

## 2020-12-02 PROCEDURE — 80053 COMPREHEN METABOLIC PANEL: CPT

## 2020-12-02 PROCEDURE — 85025 COMPLETE CBC W/AUTO DIFF WBC: CPT

## 2020-12-24 ENCOUNTER — HOSPITAL ENCOUNTER (OUTPATIENT)
Dept: HOSPITAL 76 - DI | Age: 55
Discharge: HOME | End: 2020-12-24
Attending: SURGERY
Payer: COMMERCIAL

## 2020-12-24 DIAGNOSIS — N60.11: ICD-10-CM

## 2020-12-24 DIAGNOSIS — N60.12: Primary | ICD-10-CM

## 2020-12-24 PROCEDURE — 77049 MRI BREAST C-+ W/CAD BI: CPT

## 2020-12-29 NOTE — MRI REPORT
BREAST MRI OF BOTH BREASTS: 12/24/2020

CLINICAL: Palpable left breast lump.  

 

Comparison is made to exams dated:  10/30/2020 ultrasound, 9/10/2020 ultrasound, 9/10/2020 mammogram,
 7/8/2020 mammogram - [facility], and 9/23/2016 mammogram - [facility].
  

 

TECHNIQUE:  

The patient was placed prone in a dedicated breast imaging coil.  Precontrast axial STIR and 3D FLASH
 without fat saturation sequences were obtained.  Both before and after bolus injection of contrast, 
sequential 1-minute axial 3D FLASH with fat saturation sequences for 3 time points, with subtraction 
images and maximum intensity projections (MIPs) generated.  Delayed sagittal FLASH images with fat s
aturation were also obtained.  

 

Computer-aided detection, including computer algorithm analysis of MRI image data for lesion detectio
n and characterization, pharmacokinetic analysis, with further physician review for interpretation, w
as performed.  

 

FINDINGS:  

Image quality:  Excellent.  

 

There is mild background parenchymal enhancement.  

 

Right breast:  No suspicious mass or abnormal non-mass enhancement is identified in the right breast.
 A few small nonenhancing T2-hyperintense cysts are seen in the right breast.

 

Left breast:  No suspicious mass or abnormal non-mass enhancement is identified in the left breast. S
everal small nonenhancing T2-hyperintense cysts are seen scattered throughout the left breast.

 

Miscellaneous:  There is no significantly enlarged axillary lymphadenopathy. No suspicious abnormalit
y is seen in the included anterior chest wall or upper abdomen.  

 

IMPRESSION: BENIGN 

1. No suspicious mass or abnormal non-mass enhancement in either breast.

2. No significant axillary lymphadenopathy. 

3. Small benign cysts are seen in both breasts. 

 

BIRADS 2: Benign findings. 

 

COMMENT: The imaging literature indicates that a negative contrast breast MRI examination has a high 
sensitivity and a moderate specificity for detecting and excluding invasive carcinomas to a detection
 threshold of 3-5 mm; nonetheless, appropriate clinical and mammographic follow-up are recommended.  
MRI is not sensitive for detecting DCIS (ductal carcinoma in situ) and may not detect large invasive 
neoplasms that show only minimal enhancement such as mucinous carcinoma.  If there are suspicious simona
cifications or clinically worrisome palpable masses, then biopsy should still be considered.  Invasiv
e neoplasms can be hidden by co-existent and benign enhancement caused by mastitis, hormone therapy e
ffects, radiation therapy, lactation, and recent biopsy or surgery.  False positive examinations can 
occur in a number of circumstances, including breasts that have recently been subject to invasive pro
cedures and those that contain atypical ductal hyperplasia, hormonally stimulated glandular tissue, f
at necrosis, or radial scars.  

 

A 1 year screening mammogram is recommended.  Future imaging is recommended as follows: 07/09/2021 sc
reening mammogram.  

 

 

This exam was interpreted at Station ID: [id number].  

 

Electronically Signed By: Maxwell Petersen M.D.  

ar/:12/29/2020 12:10:26  

 

 

 

ACR BI-RADS Category 2: Benign Finding(s) 3342F

BI-RADS CATEGORY: (2) - 2

RECOMMENDATION: (ANNUAL)  - Recommend routine annual screening mammography.

[id number]

1 year screening

LATERALITY: (B)

## 2021-02-10 ENCOUNTER — HOSPITAL ENCOUNTER (OUTPATIENT)
Dept: HOSPITAL 76 - LAB | Age: 56
Discharge: HOME | End: 2021-02-10
Attending: INTERNAL MEDICINE
Payer: COMMERCIAL

## 2021-02-10 DIAGNOSIS — Z51.81: ICD-10-CM

## 2021-02-10 DIAGNOSIS — M05.79: Primary | ICD-10-CM

## 2021-02-10 LAB
ALBUMIN DIAFP-MCNC: 4.1 G/DL (ref 3.2–5.5)
ALBUMIN/GLOB SERPL: 1.8 {RATIO} (ref 1–2.2)
ALP SERPL-CCNC: 38 IU/L (ref 42–121)
ALT SERPL W P-5'-P-CCNC: 16 IU/L (ref 10–60)
ANION GAP SERPL CALCULATED.4IONS-SCNC: 11 MMOL/L (ref 6–13)
AST SERPL W P-5'-P-CCNC: 19 IU/L (ref 10–42)
BASOPHILS NFR BLD AUTO: 0 10^3/UL (ref 0–0.1)
BASOPHILS NFR BLD AUTO: 0.3 %
BILIRUB BLD-MCNC: 0.6 MG/DL (ref 0.2–1)
BUN SERPL-MCNC: 30 MG/DL (ref 6–20)
CALCIUM UR-MCNC: 9.9 MG/DL (ref 8.5–10.3)
CHLORIDE SERPL-SCNC: 100 MMOL/L (ref 101–111)
CO2 SERPL-SCNC: 29 MMOL/L (ref 21–32)
CREAT SERPLBLD-SCNC: 0.9 MG/DL (ref 0.4–1)
EOSINOPHIL # BLD AUTO: 0.1 10^3/UL (ref 0–0.7)
EOSINOPHIL NFR BLD AUTO: 2.1 %
ERYTHROCYTE [DISTWIDTH] IN BLOOD BY AUTOMATED COUNT: 13.3 % (ref 12–15)
GLOBULIN SER-MCNC: 2.3 G/DL (ref 2.1–4.2)
GLUCOSE SERPL-MCNC: 79 MG/DL (ref 70–100)
HGB UR QL STRIP: 12.8 G/DL (ref 12–16)
LYMPHOCYTES # SPEC AUTO: 3 10^3/UL (ref 1.5–3.5)
LYMPHOCYTES NFR BLD AUTO: 51.3 %
MCH RBC QN AUTO: 30.5 PG (ref 27–31)
MCHC RBC AUTO-ENTMCNC: 32.1 G/DL (ref 32–36)
MCV RBC AUTO: 95 FL (ref 81–99)
MONOCYTES # BLD AUTO: 0.5 10^3/UL (ref 0–1)
MONOCYTES NFR BLD AUTO: 7.9 %
NEUTROPHILS # BLD AUTO: 2.2 10^3/UL (ref 1.5–6.6)
NEUTROPHILS # SNV AUTO: 5.8 X10^3/UL (ref 4.8–10.8)
NEUTROPHILS NFR BLD AUTO: 38.2 %
PDW BLD AUTO: 10 FL (ref 7.9–10.8)
PLATELET # BLD: 294 10^3/UL (ref 130–450)
PROT SPEC-MCNC: 6.4 G/DL (ref 6.7–8.2)
RBC MAR: 4.2 10^6/UL (ref 4.2–5.4)

## 2021-02-10 PROCEDURE — 80053 COMPREHEN METABOLIC PANEL: CPT

## 2021-02-10 PROCEDURE — 36415 COLL VENOUS BLD VENIPUNCTURE: CPT

## 2021-02-10 PROCEDURE — 85025 COMPLETE CBC W/AUTO DIFF WBC: CPT

## 2021-03-02 ENCOUNTER — HOSPITAL ENCOUNTER (OUTPATIENT)
Dept: HOSPITAL 76 - COV | Age: 56
Discharge: HOME | End: 2021-03-02
Attending: FAMILY MEDICINE
Payer: COMMERCIAL

## 2021-03-02 DIAGNOSIS — J34.89: ICD-10-CM

## 2021-03-02 DIAGNOSIS — R07.0: ICD-10-CM

## 2021-03-02 DIAGNOSIS — R53.83: ICD-10-CM

## 2021-03-02 DIAGNOSIS — R09.81: ICD-10-CM

## 2021-03-02 DIAGNOSIS — Z20.822: ICD-10-CM

## 2021-03-02 DIAGNOSIS — M79.10: ICD-10-CM

## 2021-03-02 DIAGNOSIS — R05: Primary | ICD-10-CM

## 2021-03-25 ENCOUNTER — HOSPITAL ENCOUNTER (OUTPATIENT)
Dept: HOSPITAL 76 - LAB | Age: 56
Discharge: HOME | End: 2021-03-25
Attending: INTERNAL MEDICINE
Payer: COMMERCIAL

## 2021-03-25 DIAGNOSIS — M05.79: Primary | ICD-10-CM

## 2021-03-25 DIAGNOSIS — Z79.899: ICD-10-CM

## 2021-03-25 DIAGNOSIS — Z51.81: ICD-10-CM

## 2021-03-25 LAB
ALBUMIN DIAFP-MCNC: 4.3 G/DL (ref 3.2–5.5)
ALBUMIN/GLOB SERPL: 1.7 {RATIO} (ref 1–2.2)
ALP SERPL-CCNC: 36 IU/L (ref 42–121)
ALT SERPL W P-5'-P-CCNC: 16 IU/L (ref 10–60)
ANION GAP SERPL CALCULATED.4IONS-SCNC: 8 MMOL/L (ref 6–13)
AST SERPL W P-5'-P-CCNC: 23 IU/L (ref 10–42)
BASOPHILS NFR BLD AUTO: 0 10^3/UL (ref 0–0.1)
BASOPHILS NFR BLD AUTO: 0.2 %
BILIRUB BLD-MCNC: 0.4 MG/DL (ref 0.2–1)
BUN SERPL-MCNC: 27 MG/DL (ref 6–20)
CALCIUM UR-MCNC: 9.8 MG/DL (ref 8.5–10.3)
CHLORIDE SERPL-SCNC: 101 MMOL/L (ref 101–111)
CO2 SERPL-SCNC: 29 MMOL/L (ref 21–32)
CREAT SERPLBLD-SCNC: 0.9 MG/DL (ref 0.4–1)
EOSINOPHIL # BLD AUTO: 0.1 10^3/UL (ref 0–0.7)
EOSINOPHIL NFR BLD AUTO: 2.8 %
ERYTHROCYTE [DISTWIDTH] IN BLOOD BY AUTOMATED COUNT: 13.1 % (ref 12–15)
GFRSERPLBLD MDRD-ARVRAT: 65 ML/MIN/{1.73_M2} (ref 89–?)
GLOBULIN SER-MCNC: 2.6 G/DL (ref 2.1–4.2)
GLUCOSE SERPL-MCNC: 96 MG/DL (ref 70–100)
HCT VFR BLD AUTO: 39.2 % (ref 37–47)
HGB UR QL STRIP: 12.7 G/DL (ref 12–16)
LYMPHOCYTES # SPEC AUTO: 1.6 10^3/UL (ref 1.5–3.5)
LYMPHOCYTES NFR BLD AUTO: 31.3 %
MCH RBC QN AUTO: 30.2 PG (ref 27–31)
MCHC RBC AUTO-ENTMCNC: 32.4 G/DL (ref 32–36)
MCV RBC AUTO: 93.3 FL (ref 81–99)
MONOCYTES # BLD AUTO: 0.4 10^3/UL (ref 0–1)
MONOCYTES NFR BLD AUTO: 7 %
NEUTROPHILS # BLD AUTO: 2.9 10^3/UL (ref 1.5–6.6)
NEUTROPHILS # SNV AUTO: 5 X10^3/UL (ref 4.8–10.8)
NEUTROPHILS NFR BLD AUTO: 58.5 %
NRBC # BLD AUTO: 0 /100WBC
NRBC # BLD AUTO: 0 X10^3/UL
PDW BLD AUTO: 10.2 FL (ref 7.9–10.8)
PLATELET # BLD: 245 10^3/UL (ref 130–450)
POTASSIUM SERPL-SCNC: 4.3 MMOL/L (ref 3.5–5)
PROT SPEC-MCNC: 6.9 G/DL (ref 6.7–8.2)
RBC MAR: 4.2 10^6/UL (ref 4.2–5.4)
SODIUM SERPLBLD-SCNC: 138 MMOL/L (ref 135–145)

## 2021-03-25 PROCEDURE — 85025 COMPLETE CBC W/AUTO DIFF WBC: CPT

## 2021-03-25 PROCEDURE — 36415 COLL VENOUS BLD VENIPUNCTURE: CPT

## 2021-03-25 PROCEDURE — 80053 COMPREHEN METABOLIC PANEL: CPT

## 2021-04-19 ENCOUNTER — HOSPITAL ENCOUNTER (OUTPATIENT)
Dept: HOSPITAL 76 - DI | Age: 56
Discharge: HOME | End: 2021-04-19
Attending: NURSE PRACTITIONER
Payer: COMMERCIAL

## 2021-04-19 DIAGNOSIS — N64.4: Primary | ICD-10-CM

## 2021-04-19 DIAGNOSIS — R07.9: ICD-10-CM

## 2021-04-20 NOTE — MAMMOGRAPHY REPORT
UNILATERAL LEFT DIGITAL DIAGNOSTIC MAMMOGRAM 3D/2D: 4/19/2021

CLINICAL: Focal left breast pain.  

 

Comparison is made to exams dated:  9/10/2020 mammogram, 7/8/2020 mammogram - [facility]
enter, 8/19/2019 mammogram - [facility], 12/24/2020 breast MRI, 10/30/2020 ult
rasound - 

[facility], and 9/23/2016 mammogram - [facility].  The tissue of left b
reast is heterogeneously dense. This may lower the sensitivity of mammography.  

 

No significant masses, calcifications, or other findings are seen in the breast.  

 

IMPRESSION: INCOMPLETE: NEEDS ADDITIONAL IMAGING EVALUATION

No mammographic evidence of malignancy. 

 

A targeted ultrasound of the focal pain in the left breast is recommended and will immediately follow
. 

 

 

This exam was interpreted at Station ID: [id number].  

 

NOTE: For mammograms, a report in lay terms will be sent to the patient. Approximately 15% of breast 
malignancies will not be visualized mammographically. In the management of a palpable breast mass, a 
negative mammogram must not discourage biopsy of a clinically suspicious lesion.

 

Electronically Signed By: Isaac Corona M.D.          

slc/:4/19/2021 11:20:57  

 

 

 

ACR BI-RADS Category 0: Incomplete 3340F

PARENCHYMAL PATTERN: (D) - The breast(s) demonstrate(s) heterogeneously dense fibroglandular parcharliey
ma.

BI-RADS CATEGORY: (0) - 0

Ultrasound

[id number]

Immediate follow-up

LATERALITY: (B)

## 2021-04-20 NOTE — ULTRASOUND REPORT
LIMITED ULTRASOUND OF LEFT BREAST: 4/19/2021

CLINICAL: Focal left breast pain.  

 

Comparison is made to exams dated:  4/19/2021 mammogram, 12/24/2020 breast MRI, 10/30/2020 ultrasound
, 9/10/2020 ultrasound, 9/10/2020 mammogram, and 7/8/2020 mammogram - [facility].  


 

Color flow and real-time ultrasound of the left breast 1 o'clock region were performed.  Gray scale i
mages of the real-time examination were reviewed.  

 

No significant abnormalities were seen sonographically in the left breast.  

 

IMPRESSION: NEGATIVE 

There is no sonographic evidence of malignancy in the region of pain.  

 

Return to screening mammogram schedule is recommended.

 

Exam findings were conveyed to the patient. Patient is advised to monitor for significant change. Cli
nical follow-up as needed.   

 

This exam was interpreted at Station ID: [id number].  

Electronically Signed By: Isaac Corona M.D.  

slc/:4/19/2021 11:47:17  

 

 

 

Ultrasound BI-RADS: 1 Negative

BI-RADS CATEGORY: (1) - 1

Mammogram

[id number]

return to screening

LATERALITY: (B)

## 2021-04-30 ENCOUNTER — HOSPITAL ENCOUNTER (OUTPATIENT)
Dept: HOSPITAL 76 - LAB | Age: 56
Discharge: HOME | End: 2021-04-30
Attending: INTERNAL MEDICINE
Payer: COMMERCIAL

## 2021-04-30 DIAGNOSIS — M05.79: Primary | ICD-10-CM

## 2021-04-30 DIAGNOSIS — Z51.81: ICD-10-CM

## 2021-04-30 DIAGNOSIS — Z79.899: ICD-10-CM

## 2021-04-30 LAB
ALBUMIN DIAFP-MCNC: 4.2 G/DL (ref 3.2–5.5)
ALBUMIN/GLOB SERPL: 1.9 {RATIO} (ref 1–2.2)
ALP SERPL-CCNC: 33 IU/L (ref 42–121)
ALT SERPL W P-5'-P-CCNC: 14 IU/L (ref 10–60)
ANION GAP SERPL CALCULATED.4IONS-SCNC: 6 MMOL/L (ref 6–13)
AST SERPL W P-5'-P-CCNC: 19 IU/L (ref 10–42)
BASOPHILS NFR BLD AUTO: 0 10^3/UL (ref 0–0.1)
BASOPHILS NFR BLD AUTO: 0.4 %
BILIRUB BLD-MCNC: 0.6 MG/DL (ref 0.2–1)
BUN SERPL-MCNC: 30 MG/DL (ref 6–20)
CALCIUM UR-MCNC: 9.3 MG/DL (ref 8.5–10.3)
CHLORIDE SERPL-SCNC: 101 MMOL/L (ref 101–111)
CO2 SERPL-SCNC: 28 MMOL/L (ref 21–32)
CREAT SERPLBLD-SCNC: 0.9 MG/DL (ref 0.4–1)
EOSINOPHIL # BLD AUTO: 0.1 10^3/UL (ref 0–0.7)
EOSINOPHIL NFR BLD AUTO: 1.8 %
ERYTHROCYTE [DISTWIDTH] IN BLOOD BY AUTOMATED COUNT: 13.4 % (ref 12–15)
GFRSERPLBLD MDRD-ARVRAT: 65 ML/MIN/{1.73_M2} (ref 89–?)
GLOBULIN SER-MCNC: 2.2 G/DL (ref 2.1–4.2)
GLUCOSE SERPL-MCNC: 132 MG/DL (ref 70–100)
HCT VFR BLD AUTO: 37.8 % (ref 37–47)
HGB UR QL STRIP: 12.5 G/DL (ref 12–16)
LYMPHOCYTES # SPEC AUTO: 1.9 10^3/UL (ref 1.5–3.5)
LYMPHOCYTES NFR BLD AUTO: 35.8 %
MCH RBC QN AUTO: 30.8 PG (ref 27–31)
MCHC RBC AUTO-ENTMCNC: 33.1 G/DL (ref 32–36)
MCV RBC AUTO: 93.1 FL (ref 81–99)
MONOCYTES # BLD AUTO: 0.4 10^3/UL (ref 0–1)
MONOCYTES NFR BLD AUTO: 7.7 %
NEUTROPHILS # BLD AUTO: 2.9 10^3/UL (ref 1.5–6.6)
NEUTROPHILS # SNV AUTO: 5.4 X10^3/UL (ref 4.8–10.8)
NEUTROPHILS NFR BLD AUTO: 54.1 %
NRBC # BLD AUTO: 0 /100WBC
NRBC # BLD AUTO: 0 X10^3/UL
PDW BLD AUTO: 9.9 FL (ref 7.9–10.8)
PLATELET # BLD: 252 10^3/UL (ref 130–450)
POTASSIUM SERPL-SCNC: 4.2 MMOL/L (ref 3.5–5)
PROT SPEC-MCNC: 6.4 G/DL (ref 6.7–8.2)
RBC MAR: 4.06 10^6/UL (ref 4.2–5.4)
SODIUM SERPLBLD-SCNC: 135 MMOL/L (ref 135–145)

## 2021-04-30 PROCEDURE — 80053 COMPREHEN METABOLIC PANEL: CPT

## 2021-04-30 PROCEDURE — 36415 COLL VENOUS BLD VENIPUNCTURE: CPT

## 2021-04-30 PROCEDURE — 85025 COMPLETE CBC W/AUTO DIFF WBC: CPT

## 2021-06-10 ENCOUNTER — HOSPITAL ENCOUNTER (OUTPATIENT)
Dept: HOSPITAL 76 - LAB | Age: 56
Discharge: HOME | End: 2021-06-10
Attending: INTERNAL MEDICINE
Payer: COMMERCIAL

## 2021-06-10 DIAGNOSIS — Z51.81: ICD-10-CM

## 2021-06-10 DIAGNOSIS — Z79.899: ICD-10-CM

## 2021-06-10 DIAGNOSIS — M05.79: Primary | ICD-10-CM

## 2021-06-10 LAB
ALBUMIN DIAFP-MCNC: 4 G/DL (ref 3.2–5.5)
ALBUMIN/GLOB SERPL: 1.7 {RATIO} (ref 1–2.2)
ALP SERPL-CCNC: 30 IU/L (ref 42–121)
ALT SERPL W P-5'-P-CCNC: 16 IU/L (ref 10–60)
ANION GAP SERPL CALCULATED.4IONS-SCNC: 7 MMOL/L (ref 6–13)
AST SERPL W P-5'-P-CCNC: 22 IU/L (ref 10–42)
BASOPHILS NFR BLD AUTO: 0 10^3/UL (ref 0–0.1)
BASOPHILS NFR BLD AUTO: 0.2 %
BILIRUB BLD-MCNC: 0.6 MG/DL (ref 0.2–1)
BUN SERPL-MCNC: 33 MG/DL (ref 6–20)
CALCIUM UR-MCNC: 9.3 MG/DL (ref 8.5–10.3)
CHLORIDE SERPL-SCNC: 104 MMOL/L (ref 101–111)
CO2 SERPL-SCNC: 28 MMOL/L (ref 21–32)
CREAT SERPLBLD-SCNC: 0.8 MG/DL (ref 0.4–1)
EOSINOPHIL # BLD AUTO: 0.1 10^3/UL (ref 0–0.7)
EOSINOPHIL NFR BLD AUTO: 1.6 %
ERYTHROCYTE [DISTWIDTH] IN BLOOD BY AUTOMATED COUNT: 12.5 % (ref 12–15)
GFRSERPLBLD MDRD-ARVRAT: 74 ML/MIN/{1.73_M2} (ref 89–?)
GLOBULIN SER-MCNC: 2.4 G/DL (ref 2.1–4.2)
GLUCOSE SERPL-MCNC: 107 MG/DL (ref 70–100)
HCT VFR BLD AUTO: 37.9 % (ref 37–47)
HGB UR QL STRIP: 12.6 G/DL (ref 12–16)
LYMPHOCYTES # SPEC AUTO: 1.1 10^3/UL (ref 1.5–3.5)
LYMPHOCYTES NFR BLD AUTO: 22.8 %
MCH RBC QN AUTO: 31 PG (ref 27–31)
MCHC RBC AUTO-ENTMCNC: 33.2 G/DL (ref 32–36)
MCV RBC AUTO: 93.3 FL (ref 81–99)
MONOCYTES # BLD AUTO: 0.5 10^3/UL (ref 0–1)
MONOCYTES NFR BLD AUTO: 9.7 %
NEUTROPHILS # BLD AUTO: 3.2 10^3/UL (ref 1.5–6.6)
NEUTROPHILS # SNV AUTO: 4.9 X10^3/UL (ref 4.8–10.8)
NEUTROPHILS NFR BLD AUTO: 65.5 %
NRBC # BLD AUTO: 0 /100WBC
NRBC # BLD AUTO: 0 X10^3/UL
PDW BLD AUTO: 10.3 FL (ref 7.9–10.8)
PLATELET # BLD: 196 10^3/UL (ref 130–450)
POTASSIUM SERPL-SCNC: 3.9 MMOL/L (ref 3.5–5)
PROT SPEC-MCNC: 6.4 G/DL (ref 6.7–8.2)
RBC MAR: 4.06 10^6/UL (ref 4.2–5.4)
SODIUM SERPLBLD-SCNC: 139 MMOL/L (ref 135–145)

## 2021-06-10 PROCEDURE — 36415 COLL VENOUS BLD VENIPUNCTURE: CPT

## 2021-06-10 PROCEDURE — 85025 COMPLETE CBC W/AUTO DIFF WBC: CPT

## 2021-06-10 PROCEDURE — 80053 COMPREHEN METABOLIC PANEL: CPT

## 2021-09-09 ENCOUNTER — HOSPITAL ENCOUNTER (OUTPATIENT)
Dept: HOSPITAL 76 - LAB | Age: 56
Discharge: HOME | End: 2021-09-09
Attending: INTERNAL MEDICINE
Payer: COMMERCIAL

## 2021-09-09 DIAGNOSIS — M05.79: Primary | ICD-10-CM

## 2021-09-09 LAB
ALBUMIN DIAFP-MCNC: 4.6 G/DL (ref 3.2–5.5)
ALBUMIN/GLOB SERPL: 2.1 {RATIO} (ref 1–2.2)
ALP SERPL-CCNC: 34 IU/L (ref 42–121)
ALT SERPL W P-5'-P-CCNC: 17 IU/L (ref 10–60)
ANION GAP SERPL CALCULATED.4IONS-SCNC: 11 MMOL/L (ref 6–13)
AST SERPL W P-5'-P-CCNC: 24 IU/L (ref 10–42)
BASOPHILS NFR BLD AUTO: 0 10^3/UL (ref 0–0.1)
BASOPHILS NFR BLD AUTO: 0.2 %
BILIRUB BLD-MCNC: 0.6 MG/DL (ref 0.2–1)
BUN SERPL-MCNC: 25 MG/DL (ref 6–20)
CALCIUM UR-MCNC: 9.7 MG/DL (ref 8.5–10.3)
CHLORIDE SERPL-SCNC: 101 MMOL/L (ref 101–111)
CO2 SERPL-SCNC: 27 MMOL/L (ref 21–32)
CREAT SERPLBLD-SCNC: 1.1 MG/DL (ref 0.4–1)
EOSINOPHIL # BLD AUTO: 0.1 10^3/UL (ref 0–0.7)
EOSINOPHIL NFR BLD AUTO: 1.7 %
ERYTHROCYTE [DISTWIDTH] IN BLOOD BY AUTOMATED COUNT: 12.9 % (ref 12–15)
GFRSERPLBLD MDRD-ARVRAT: 51 ML/MIN/{1.73_M2} (ref 89–?)
GLOBULIN SER-MCNC: 2.2 G/DL (ref 2.1–4.2)
GLUCOSE SERPL-MCNC: 97 MG/DL (ref 70–100)
HCT VFR BLD AUTO: 39.5 % (ref 37–47)
HGB UR QL STRIP: 12.9 G/DL (ref 12–16)
LYMPHOCYTES # SPEC AUTO: 1.5 10^3/UL (ref 1.5–3.5)
LYMPHOCYTES NFR BLD AUTO: 32.5 %
MCH RBC QN AUTO: 30.6 PG (ref 27–31)
MCHC RBC AUTO-ENTMCNC: 32.7 G/DL (ref 32–36)
MCV RBC AUTO: 93.6 FL (ref 81–99)
MONOCYTES # BLD AUTO: 0.5 10^3/UL (ref 0–1)
MONOCYTES NFR BLD AUTO: 9.7 %
NEUTROPHILS # BLD AUTO: 2.6 10^3/UL (ref 1.5–6.6)
NEUTROPHILS # SNV AUTO: 4.6 X10^3/UL (ref 4.8–10.8)
NEUTROPHILS NFR BLD AUTO: 55.7 %
NRBC # BLD AUTO: 0 /100WBC
NRBC # BLD AUTO: 0 X10^3/UL
PDW BLD AUTO: 9.8 FL (ref 7.9–10.8)
PLATELET # BLD: 250 10^3/UL (ref 130–450)
POTASSIUM SERPL-SCNC: 5 MMOL/L (ref 3.5–5)
PROT SPEC-MCNC: 6.8 G/DL (ref 6.7–8.2)
RBC MAR: 4.22 10^6/UL (ref 4.2–5.4)
SODIUM SERPLBLD-SCNC: 139 MMOL/L (ref 135–145)

## 2021-09-09 PROCEDURE — 85651 RBC SED RATE NONAUTOMATED: CPT

## 2021-09-09 PROCEDURE — 85025 COMPLETE CBC W/AUTO DIFF WBC: CPT

## 2021-09-09 PROCEDURE — 36415 COLL VENOUS BLD VENIPUNCTURE: CPT

## 2021-09-09 PROCEDURE — 80053 COMPREHEN METABOLIC PANEL: CPT

## 2021-09-15 ENCOUNTER — HOSPITAL ENCOUNTER (OUTPATIENT)
Dept: HOSPITAL 76 - LAB | Age: 56
Discharge: HOME | End: 2021-09-15
Attending: INTERNAL MEDICINE
Payer: COMMERCIAL

## 2021-09-15 DIAGNOSIS — R10.9: ICD-10-CM

## 2021-09-15 DIAGNOSIS — M06.9: Primary | ICD-10-CM

## 2021-09-15 LAB
AMYLASE SERPL-CCNC: 56 U/L (ref 28–100)
BUN SERPL-MCNC: 26 MG/DL (ref 6–20)
CLARITY UR REFRACT.AUTO: CLEAR
CREAT SERPLBLD-SCNC: 0.8 MG/DL (ref 0.4–1)
GFRSERPLBLD MDRD-ARVRAT: 74 ML/MIN/{1.73_M2} (ref 89–?)
GLUCOSE UR QL STRIP.AUTO: NEGATIVE MG/DL
KETONES UR QL STRIP.AUTO: NEGATIVE MG/DL
LIPASE SERPL-CCNC: 49 U/L (ref 22–51)
NITRITE UR QL STRIP.AUTO: NEGATIVE
PH UR STRIP.AUTO: 5.5 PH (ref 5–7.5)
PROT UR STRIP.AUTO-MCNC: NEGATIVE MG/DL
RBC # UR STRIP.AUTO: NEGATIVE /UL
RBC # URNS HPF: (no result) /HPF (ref 0–5)
SP GR UR STRIP.AUTO: >=1.03 (ref 1–1.03)
SQUAMOUS URNS QL MICRO: (no result)
UROBILINOGEN UR QL STRIP.AUTO: (no result) E.U./DL
UROBILINOGEN UR STRIP.AUTO-MCNC: NEGATIVE MG/DL
WBC # UR MANUAL: (no result) /HPF (ref 0–5)

## 2021-09-15 PROCEDURE — 81001 URINALYSIS AUTO W/SCOPE: CPT

## 2021-09-15 PROCEDURE — 84520 ASSAY OF UREA NITROGEN: CPT

## 2021-09-15 PROCEDURE — 82150 ASSAY OF AMYLASE: CPT

## 2021-09-15 PROCEDURE — 82565 ASSAY OF CREATININE: CPT

## 2021-09-15 PROCEDURE — 36415 COLL VENOUS BLD VENIPUNCTURE: CPT

## 2021-09-15 PROCEDURE — 87086 URINE CULTURE/COLONY COUNT: CPT

## 2021-09-15 PROCEDURE — 83690 ASSAY OF LIPASE: CPT

## 2021-09-19 ENCOUNTER — HOSPITAL ENCOUNTER (EMERGENCY)
Dept: HOSPITAL 76 - ED | Age: 56
Discharge: HOME | End: 2021-09-19
Payer: COMMERCIAL

## 2021-09-19 VITALS — DIASTOLIC BLOOD PRESSURE: 59 MMHG | SYSTOLIC BLOOD PRESSURE: 118 MMHG

## 2021-09-19 DIAGNOSIS — J01.00: Primary | ICD-10-CM

## 2021-09-19 PROCEDURE — 99284 EMERGENCY DEPT VISIT MOD MDM: CPT

## 2021-09-19 PROCEDURE — 96372 THER/PROPH/DIAG INJ SC/IM: CPT

## 2021-09-19 NOTE — ED PHYSICIAN DOCUMENTATION
History of Present Illness





- Stated complaint


Stated Complaint: L FACE/THROAT PX





- Chief complaint


Chief Complaint: Heent





- History obtained from


History obtained from: Patient





- History of Present Illness


Pain level max: 8


Pain level now: 8





- Additonal information


Additional information: 





Patient is a 56-year-old female who presents to the emergency department with 

ongoing left-sided facial pain.  This been ongoing for the past 5 weeks or so.  

She is seeing a periodontist, dentist and endodontist.  She states that they 

keep prescribing her amoxicillin which does seem to help, but the pain has been 

increasing.  The current thought is that on one of her root canals they did not 

go deep enough and left a portion of the nerve root.  No fevers.  No chills.  

Worse with palpation, eating.  Nothing makes it better.  The pain is described 

as dull and aching.  Also has pain up near her left maxillary and left frontal 

sinuses.  She states she has had some congestion.  No changes with light or 

sound.





Review of Systems


Constitutional: denies: Fever, Chills


Respiratory: denies: Cough


GI: denies: Nausea, Vomiting, Diarrhea


Skin: denies: Rash





PD PAST MEDICAL HISTORY





- Past Medical History


Past Medical History: Yes


Cardiovascular: None, Hypertension, Other


Respiratory: None


Neuro: Migraines


Endocrine/Autoimmune: None


GI: GERD


: None


HEENT: None


Psych: None


Musculoskeletal: Rheumatoid arthritis


Derm: None





- Past Surgical History


Past Surgical History: Yes


General: Cholecystectomy, Appendectomy


Ortho: Hip replacement, Other


/GYN: Hysterectomy


HEENT: Tonsil/Adenoidectomy





- Present Medications


Home Medications: 


                                Ambulatory Orders











 Medication  Instructions  Recorded  Confirmed


 


atenoloL [Atenolol] 25 mg PO BID 03/13/17 09/19/21


 


Omeprazole [PriLOSEC] 20 mg PO DAILY 06/21/18 09/19/21


 


Gabapentin 300 mg PO TID 01/19/20 09/19/21


 


Amox/Clav 875/125 [Augmentin] 1 tab PO Q12H #20 tablet 09/19/21 


 


Oxycodone HCl/Acetaminophen 1 - 2 each PO Q6H PRN #14 tablet 09/19/21 





[Percocet 5-325 mg Tablet]   


 


Promethazine [Phenergan] 25 mg PO Q6H PRN #10 tab 09/19/21 


 


Upadacitinib [Rinvoq] 15 mg PO DAILY 09/19/21 09/19/21














- Allergies


Allergies/Adverse Reactions: 


                                    Allergies











Allergy/AdvReac Type Severity Reaction Status Date / Time


 


codeine Allergy  Unknown Verified 09/19/21 12:17














- Social History


Does the pt smoke?: No


Smoking Status: Never smoker


Does the pt drink ETOH?: No


Does the pt have substance abuse?: No





- Immunizations


Immunizations are current?: Yes





- POLST


Patient has POLST: No





PD ED PE NORMAL





- Vitals


Vital signs reviewed: Yes





- General


General: Alert and oriented X 3, No acute distress, Well developed/nourished





- HEENT


HEENT: PERRL, Ears normal, Moist mucous membranes, Pharynx benign, Other (mild L

 facial swelling, TTP over the L maxillary sinus.  no dental tenderness or 

gingival abscess.)





- Neck


Neck: Supple, no meningeal sign





- Cardiac


Cardiac: RRR





- Respiratory


Respiratory: No respiratory distress, Clear bilaterally





- Abdomen


Abdomen: Soft, Non tender, Non distended





- Derm


Derm: Warm and dry





- Neuro


Neuro: Alert and oriented X 3





- Psych


Psych: Normal mood, Normal affect





Results





- Vitals


Vitals: 


                               Vital Signs - 24 hr











  09/19/21





  12:12


 


Temperature 36.9 C


 


Heart Rate 69


 


Respiratory 14





Rate 


 


Blood Pressure 126/63


 


O2 Saturation 100








                                     Oxygen











O2 Source                      Room air

















- Rads (name of study)


  ** maxillofacial CT


Radiology: Final report received, EMP read contemporaneously, See rad report





PD MEDICAL DECISION MAKING





- ED course


Complexity details: reviewed results, re-evaluated patient, considered 

differential, d/w patient


ED course: 





Patient with left-sided facial pain and swelling of unclear etiology.  Does have

 some maxillary sinus disease.  We will change her from amoxicillin to Augmentin

 and place her on pain medication for home.  Patient had been on pain medication

 recently for her back.  Patient is well-appearing, nontoxic.  Afebrile.  No 

drainable abscess.  Patient will follow up with her doctor and dentist this 

week.  I am prescribing a short course of short-acting opioid pain medication 

for this patient. I have reviewed the patients  and no concerning findings 

were noted. I have discussed that the opioids are for short term therapy only, 

and will not be refilled from the ED. patient counseled regarding signs and 

symptoms for which I believe and urgent re-evaluation would be necessary. 

Patient with good understanding of and agreement to plan and is comfortable 

going home at this time





This document was made in part using voice recognition software. While efforts 

are made to proofread this document, sound alike and grammatical errors may 

occur.








IMPRESSION: Minimal left face soft tissue swelling, without abscess. 





Focal left maxillary sinus disease. 





Departure





- Departure


Disposition: 01 Home, Self Care


Clinical Impression: 


Sinusitis


Qualifiers:


 Sinusitis location: maxillary Chronicity: acute Recurrence: non-recurrent 

Qualified Code(s): J01.00 - Acute maxillary sinusitis, unspecified





Condition: Good


Instructions:  ED Sinusitis Abx Tx


Follow-Up: 


your,doctor in 3 days [Other]


Prescriptions: 


Amox/Clav 875/125 [Augmentin] 1 tab PO Q12H #20 tablet


Oxycodone HCl/Acetaminophen [Percocet 5-325 mg Tablet] 1 - 2 each PO Q6H PRN #14

tablet


 PRN Reason: pain


Promethazine [Phenergan] 25 mg PO Q6H PRN #10 tab


 PRN Reason: Nausea / Vomiting


Comments: 


Take all antibiotics until gone.  Follow-up with your dentist for further care. 

 It does appear that you do have some left maxillary sinus disease.  Otherwise 

your CT scan of your face is normal aside from a small amount of soft tissue 

swelling.  Please follow-up with your doctor for further care.  Return if you 

worsen.  





Your prescriptions were sent to [facility] in [place] today.





I am prescribing a short course of narcotic pain medication for you. These are 

potentially dangerous and addictive medications that should be used carefully. 


These medications may constipate you. Take an over-the-counter stool softener 

(docusate) twice daily with plenty of water while taking these medications. If 

you go 24 hours without a bowel movement, take over-the-counter miralax, per 

package instructions.


Do not drink or drive while taking these medications. 


If you received narcotic or sedating medications while in the emergency 

department, do not drive for 24 hours.


Store this medication in a safe, secure place and out of reach of children. 


It is a violation of federal law to give or sell this medication to another 

person or to use in a manner other than prescribed.


The ED will not refill narcotic prescriptions, including prescriptions lost or 

stolen.


To dispose of unwanted medications:


1. [facility] [address] in 

[place] has a medication drop box. They accept prescription medications (in 

pill form) Monday through Friday 9:00 a.m. to 5:00 p.m. 


2. The [facility] [employer] accepts prescription medications (in

 pill form only) for disposal year round. Call [phone] for more informati

on. 


3. Contact the [facility] for the next [facility] sponsored prescription 

drug collection event. [phone], [phone], or [phone] 

[phone];

## 2021-09-19 NOTE — CT REPORT
PROCEDURE:  MAXILLOFACIAL WO

 

INDICATIONS:  L sided facial pain, swelling

 

TECHNIQUE:  

Noncontrast 1.5 mm thick axial images acquired from the mandible through the frontal sinuses, with co
penny and sagittal reformatting.  For radiation dose reduction, the following was used:  automated ex
posure control, adjustment of mA and/or kV according to patient size.

 

COMPARISON:  None.

 

FINDINGS:  

Image quality:  Excellent.  

 

Bones and teeth:  Orbital walls are intact.  Sinus walls show no fracture or deformity.  Nasal bones 
and septum are intact.  Visualized portions of the mandible demonstrate no fractures or subluxation. 
 Zygomatic arches are intact.  Pterygoid plates are intact.  Visualized portions of the skull base an
d auditory canals are intact.  

 

Sinuses:  Mild fluid is seen dependently within the left maxillary sinus.   Paranasal sinuses are oth
erwise well aerated, without fluid levels, mucosal thickening, or mucoceles.  Mastoid air cells are a
erated.  

 

Soft tissues:  Minimal left facial soft tissue swelling is seen.  No fluid collection is seen so sugg
est abscess.   No additional edema, masses, or fluid collections.  No enlarged lymph nodes.  No soft 
tissue lacerations or debris.  

 

Vascular:  Visualized vascular structures appear normal in the absence of contrast.  Bony vascular fo
ramina and canals are intact.  

 

 

 

IMPRESSION:  Minimal left face soft tissue swelling, without abscess.  

 

Focal left maxillary sinus disease.    

 

Reviewed by: Jose Kelley MD on 9/19/2021 12:28 PM ADAM

Approved by: Jose Kelley MD on 9/19/2021 12:28 PM AKSONDRA

 

 

Station ID:  [id number]

## 2021-11-16 ENCOUNTER — HOSPITAL ENCOUNTER (OUTPATIENT)
Dept: HOSPITAL 76 - LAB | Age: 56
Discharge: HOME | End: 2021-11-16
Attending: NURSE PRACTITIONER
Payer: COMMERCIAL

## 2021-11-16 DIAGNOSIS — Z86.32: ICD-10-CM

## 2021-11-16 DIAGNOSIS — R14.0: ICD-10-CM

## 2021-11-16 DIAGNOSIS — R10.13: ICD-10-CM

## 2021-11-16 DIAGNOSIS — R10.11: Primary | ICD-10-CM

## 2021-11-16 LAB
ALT SERPL W P-5'-P-CCNC: 15 IU/L (ref 10–60)
AST SERPL W P-5'-P-CCNC: 22 IU/L (ref 10–42)
CA-I SERPL ISE-MCNC: 1.23 MMOL/L (ref 1.15–1.33)
EST. AVERAGE GLUCOSE BLD GHB EST-MCNC: 123 MG/DL (ref 70–100)
HBA1C MFR BLD HPLC: 5.9 % (ref 4.27–6.07)
PH BLDV: 7.37 [PH] (ref 7.31–7.41)

## 2021-11-16 PROCEDURE — 84460 ALANINE AMINO (ALT) (SGPT): CPT

## 2021-11-16 PROCEDURE — 84450 TRANSFERASE (AST) (SGOT): CPT

## 2021-11-16 PROCEDURE — 83036 HEMOGLOBIN GLYCOSYLATED A1C: CPT

## 2021-11-16 PROCEDURE — 82330 ASSAY OF CALCIUM: CPT

## 2021-11-16 PROCEDURE — 36415 COLL VENOUS BLD VENIPUNCTURE: CPT

## 2021-12-10 ENCOUNTER — HOSPITAL ENCOUNTER (OUTPATIENT)
Dept: HOSPITAL 76 - DI | Age: 56
Discharge: HOME | End: 2021-12-10
Attending: NURSE PRACTITIONER
Payer: COMMERCIAL

## 2021-12-10 DIAGNOSIS — R14.0: ICD-10-CM

## 2021-12-10 DIAGNOSIS — R10.9: Primary | ICD-10-CM

## 2021-12-10 DIAGNOSIS — K76.0: ICD-10-CM

## 2021-12-10 DIAGNOSIS — R07.9: ICD-10-CM

## 2021-12-10 NOTE — ULTRASOUND REPORT
PROCEDURE:  Abdomen Complete

 

INDICATIONS:  ABDOMINAL BLOATING, ABDOMINAL PAIN

 

TECHNIQUE:  

Real-time scanning was performed of the abdominal and retroperitoneal organs, with image documentatio
n.  

 

COMPARISON:  None.

 

FINDINGS:  

 

Liver:  Liver is normal in size. Increased hepatic parenchymal echogenicity consistent with hepatic s
teatosis.  

 

Gallbladder: Cholecystectomy.

 

Biliary ducts:  Intrahepatic bile ducts are non-dilated.  Extrahepatic bile duct caliber measures 4 m
m.  Normal is 6-7 mm or less in diameter, or 10 mm or less post-cholecystectomy.  

 

Pancreas:  Visualized portions of the pancreas are sonographically normal.  

 

Spleen:  Spleen is normal in size and homogeneous in echotexture.  

 

Kidneys:  Kidneys are normal in size and echotexture.  Right kidney measures 10 cm long; left kidney 
measures 10 cm long.  No hydronephrosis or nephrolithiasis.  No solid masses.  

 

Aorta:  Visualized aorta is normal in caliber at less than 3 cm.  

 

Iliacs:  Proximal common iliac arteries are normal in caliber at less than 2.5 cm.  

 

IVC:  Intrahepatic inferior vena cava is patent.  

 

Miscellaneous:  No free abdominal fluid.  

 

IMPRESSION:  

Status post cholecystectomy.

 

Hepatic steatosis.

 

No acute finding.

 

Reviewed by: Ang Livingston MD on 12/10/2021 8:49 AM PST

Approved by: Ang Livingston MD on 12/10/2021 8:49 AM PST

 

 

Station ID:  [id number]

## 2021-12-17 ENCOUNTER — HOSPITAL ENCOUNTER (OUTPATIENT)
Dept: HOSPITAL 76 - LAB | Age: 56
Discharge: HOME | End: 2021-12-17
Attending: INTERNAL MEDICINE
Payer: COMMERCIAL

## 2021-12-17 DIAGNOSIS — M05.79: Primary | ICD-10-CM

## 2021-12-17 LAB
ALBUMIN DIAFP-MCNC: 4.2 G/DL (ref 3.2–5.5)
ALBUMIN/GLOB SERPL: 1.8 {RATIO} (ref 1–2.2)
ALP SERPL-CCNC: 38 IU/L (ref 42–121)
ALT SERPL W P-5'-P-CCNC: 16 IU/L (ref 10–60)
ANION GAP SERPL CALCULATED.4IONS-SCNC: 11 MMOL/L (ref 6–13)
AST SERPL W P-5'-P-CCNC: 24 IU/L (ref 10–42)
BASOPHILS NFR BLD AUTO: 0 10^3/UL (ref 0–0.1)
BASOPHILS NFR BLD AUTO: 0.2 %
BILIRUB BLD-MCNC: 0.7 MG/DL (ref 0.2–1)
BUN SERPL-MCNC: 26 MG/DL (ref 6–20)
CALCIUM UR-MCNC: 9.4 MG/DL (ref 8.5–10.3)
CHLORIDE SERPL-SCNC: 97 MMOL/L (ref 101–111)
CO2 SERPL-SCNC: 28 MMOL/L (ref 21–32)
CREAT SERPLBLD-SCNC: 0.8 MG/DL (ref 0.4–1)
EOSINOPHIL # BLD AUTO: 0 10^3/UL (ref 0–0.7)
EOSINOPHIL NFR BLD AUTO: 0.7 %
ERYTHROCYTE [DISTWIDTH] IN BLOOD BY AUTOMATED COUNT: 12.6 % (ref 12–15)
GFRSERPLBLD MDRD-ARVRAT: 74 ML/MIN/{1.73_M2} (ref 89–?)
GLOBULIN SER-MCNC: 2.4 G/DL (ref 2.1–4.2)
GLUCOSE SERPL-MCNC: 183 MG/DL (ref 70–100)
HCT VFR BLD AUTO: 35.6 % (ref 37–47)
HGB UR QL STRIP: 11.8 G/DL (ref 12–16)
LYMPHOCYTES # SPEC AUTO: 1.2 10^3/UL (ref 1.5–3.5)
LYMPHOCYTES NFR BLD AUTO: 28 %
MCH RBC QN AUTO: 30.7 PG (ref 27–31)
MCHC RBC AUTO-ENTMCNC: 33.1 G/DL (ref 32–36)
MCV RBC AUTO: 92.7 FL (ref 81–99)
MONOCYTES # BLD AUTO: 0.3 10^3/UL (ref 0–1)
MONOCYTES NFR BLD AUTO: 7.1 %
NEUTROPHILS # BLD AUTO: 2.8 10^3/UL (ref 1.5–6.6)
NEUTROPHILS # SNV AUTO: 4.4 X10^3/UL (ref 4.8–10.8)
NEUTROPHILS NFR BLD AUTO: 63.8 %
NRBC # BLD AUTO: 0 /100WBC
NRBC # BLD AUTO: 0 X10^3/UL
PDW BLD AUTO: 10 FL (ref 7.9–10.8)
PLATELET # BLD: 243 10^3/UL (ref 130–450)
POTASSIUM SERPL-SCNC: 3.9 MMOL/L (ref 3.5–5)
PROT SPEC-MCNC: 6.6 G/DL (ref 6.7–8.2)
RBC MAR: 3.84 10^6/UL (ref 4.2–5.4)
SODIUM SERPLBLD-SCNC: 136 MMOL/L (ref 135–145)

## 2021-12-17 PROCEDURE — 80053 COMPREHEN METABOLIC PANEL: CPT

## 2021-12-17 PROCEDURE — 85025 COMPLETE CBC W/AUTO DIFF WBC: CPT

## 2021-12-17 PROCEDURE — 85651 RBC SED RATE NONAUTOMATED: CPT

## 2021-12-17 PROCEDURE — 36415 COLL VENOUS BLD VENIPUNCTURE: CPT

## 2022-04-01 ENCOUNTER — HOSPITAL ENCOUNTER (OUTPATIENT)
Dept: HOSPITAL 76 - LAB | Age: 57
Discharge: HOME | End: 2022-04-01
Attending: INTERNAL MEDICINE
Payer: COMMERCIAL

## 2022-04-01 DIAGNOSIS — M05.79: Primary | ICD-10-CM

## 2022-04-01 DIAGNOSIS — R06.02: ICD-10-CM

## 2022-04-01 LAB
ALBUMIN DIAFP-MCNC: 4.2 G/DL (ref 3.2–5.5)
ALBUMIN/GLOB SERPL: 1.4 {RATIO} (ref 1–2.2)
ALP SERPL-CCNC: 42 IU/L (ref 42–121)
ALT SERPL W P-5'-P-CCNC: 16 IU/L (ref 10–60)
ANION GAP SERPL CALCULATED.4IONS-SCNC: 8 MMOL/L (ref 6–13)
AST SERPL W P-5'-P-CCNC: 20 IU/L (ref 10–42)
BASOPHILS NFR BLD AUTO: 0 10^3/UL (ref 0–0.1)
BASOPHILS NFR BLD AUTO: 0.3 %
BILIRUB BLD-MCNC: 0.5 MG/DL (ref 0.2–1)
BUN SERPL-MCNC: 23 MG/DL (ref 6–20)
CALCIUM UR-MCNC: 9.4 MG/DL (ref 8.5–10.3)
CHLORIDE SERPL-SCNC: 101 MMOL/L (ref 101–111)
CHOLEST SERPL-MCNC: 279 MG/DL
CO2 SERPL-SCNC: 26 MMOL/L (ref 21–32)
CREAT SERPLBLD-SCNC: 0.7 MG/DL (ref 0.4–1)
EOSINOPHIL # BLD AUTO: 0 10^3/UL (ref 0–0.7)
EOSINOPHIL NFR BLD AUTO: 1 %
ERYTHROCYTE [DISTWIDTH] IN BLOOD BY AUTOMATED COUNT: 13 % (ref 12–15)
GFRSERPLBLD MDRD-ARVRAT: 86 ML/MIN/{1.73_M2} (ref 89–?)
GLOBULIN SER-MCNC: 3 G/DL (ref 2.1–4.2)
GLUCOSE SERPL-MCNC: 95 MG/DL (ref 70–100)
HCT VFR BLD AUTO: 38.4 % (ref 37–47)
HDLC SERPL-MCNC: 102 MG/DL
HDLC SERPL: 2.7 {RATIO} (ref ?–4.4)
HGB UR QL STRIP: 12.7 G/DL (ref 12–16)
LDLC SERPL CALC-MCNC: 161 MG/DL
LDLC/HDLC SERPL: 1.6 {RATIO} (ref ?–4.4)
LYMPHOCYTES # SPEC AUTO: 1.6 10^3/UL (ref 1.5–3.5)
LYMPHOCYTES NFR BLD AUTO: 41.1 %
MCH RBC QN AUTO: 30.2 PG (ref 27–31)
MCHC RBC AUTO-ENTMCNC: 33.1 G/DL (ref 32–36)
MCV RBC AUTO: 91.2 FL (ref 81–99)
MONOCYTES # BLD AUTO: 0.4 10^3/UL (ref 0–1)
MONOCYTES NFR BLD AUTO: 9.8 %
NEUTROPHILS # BLD AUTO: 1.8 10^3/UL (ref 1.5–6.6)
NEUTROPHILS # SNV AUTO: 3.9 X10^3/UL (ref 4.8–10.8)
NEUTROPHILS NFR BLD AUTO: 47.5 %
NRBC # BLD AUTO: 0 /100WBC
NRBC # BLD AUTO: 0 X10^3/UL
PDW BLD AUTO: 10.5 FL (ref 7.9–10.8)
PLATELET # BLD: 246 10^3/UL (ref 130–450)
POTASSIUM SERPL-SCNC: 4.2 MMOL/L (ref 3.5–5)
PROT SPEC-MCNC: 7.2 G/DL (ref 6.7–8.2)
RBC MAR: 4.21 10^6/UL (ref 4.2–5.4)
SODIUM SERPLBLD-SCNC: 135 MMOL/L (ref 135–145)
TRIGL P FAST SERPL-MCNC: 81 MG/DL
VLDLC SERPL-SCNC: 16 MG/DL

## 2022-04-01 PROCEDURE — 85025 COMPLETE CBC W/AUTO DIFF WBC: CPT

## 2022-04-01 PROCEDURE — 80061 LIPID PANEL: CPT

## 2022-04-01 PROCEDURE — 83721 ASSAY OF BLOOD LIPOPROTEIN: CPT

## 2022-04-01 PROCEDURE — 36415 COLL VENOUS BLD VENIPUNCTURE: CPT

## 2022-04-01 PROCEDURE — 85651 RBC SED RATE NONAUTOMATED: CPT

## 2022-04-01 PROCEDURE — 80053 COMPREHEN METABOLIC PANEL: CPT

## 2022-04-28 ENCOUNTER — HOSPITAL ENCOUNTER (OUTPATIENT)
Dept: HOSPITAL 76 - DI.S | Age: 57
Discharge: HOME | End: 2022-04-28
Attending: PHYSICIAN ASSISTANT
Payer: COMMERCIAL

## 2022-04-28 DIAGNOSIS — M47.27: Primary | ICD-10-CM

## 2022-04-28 DIAGNOSIS — Z96.641: ICD-10-CM

## 2022-04-28 DIAGNOSIS — Z98.1: ICD-10-CM
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
## 2022-05-22 ENCOUNTER — HOSPITAL ENCOUNTER (OUTPATIENT)
Dept: HOSPITAL 76 - DI.S | Age: 57
Discharge: HOME | End: 2022-05-22
Attending: GENERAL ACUTE CARE HOSPITAL
Payer: COMMERCIAL

## 2022-05-22 DIAGNOSIS — Z12.31: Primary | ICD-10-CM

## 2022-05-23 NOTE — MAMMOGRAPHY REPORT
BILATERAL DIGITAL SCREENING MAMMOGRAM 3D/2D WITH EXAGGERATED CC: 5/22/2022

 

CLINICAL: Routine screening. Routine screening.  

 

Comparison is made to exams dated:  4/19/2021 mammogram, 9/10/2020 mammogram, 7/8/2020 mammogram - [facility], 8/19/2019 mammogram - [facility], 9/23/2016 mammog
[facility], and 12/18/2017 ultrasound - [facility].  The tis
jey of both breasts is heterogeneously dense. This may lower the sensitivity of mammography.  

 

There are stable benign calcifications in the left breast.  

No significant masses, calcifications, or other findings are seen in either breast.  

There has been no significant interval change.

 

IMPRESSION: BENIGN

There is no mammographic evidence of malignancy. A 1 year screening mammogram is recommended.  

 

 

This exam was interpreted at Station ID: [id number].  

 

NOTE: For mammograms, a report in lay terms will be sent to the patient. Approximately 15% of breast 
malignancies will not be visualized mammographically. In the management of a palpable breast mass, a 
negative mammogram must not discourage biopsy of a clinically suspicious lesion.

 

Electronically Signed By: Kenzie olivia/penrad:5/23/2022 08:48:45  

 

 

 

ACR BI-RADS Category 2: Benign Finding(s) 3342F

PARENCHYMAL PATTERN: (D) - The breast(s) demonstrate(s) heterogeneously dense fibroglandular reji peres.

BI-RADS CATEGORY: (2) - 2

RECOMMENDATION: (ANNUAL)  - Recommend routine annual screening mammography.

[id number]

1 year screening

LATERALITY: (B)

## 2022-07-06 ENCOUNTER — HOSPITAL ENCOUNTER (OUTPATIENT)
Dept: HOSPITAL 76 - LAB | Age: 57
Discharge: HOME | End: 2022-07-06
Attending: INTERNAL MEDICINE
Payer: COMMERCIAL

## 2022-07-06 DIAGNOSIS — R74.8: ICD-10-CM

## 2022-07-06 DIAGNOSIS — R03.0: ICD-10-CM

## 2022-07-06 DIAGNOSIS — M06.9: ICD-10-CM

## 2022-07-06 DIAGNOSIS — R53.83: ICD-10-CM

## 2022-07-06 DIAGNOSIS — R06.02: Primary | ICD-10-CM

## 2022-07-06 DIAGNOSIS — G43.909: ICD-10-CM

## 2022-07-06 LAB
CHOLEST SERPL-MCNC: 285 MG/DL
HDLC SERPL-MCNC: 81 MG/DL
HDLC SERPL: 3.5 {RATIO} (ref ?–4.4)
LDLC SERPL CALC-MCNC: 184 MG/DL
LDLC/HDLC SERPL: 2.3 {RATIO} (ref ?–4.4)
TRIGL P FAST SERPL-MCNC: 100 MG/DL
TSH SERPL-ACNC: 0.98 UIU/ML (ref 0.34–5.6)
VLDLC SERPL-SCNC: 20 MG/DL

## 2022-07-06 PROCEDURE — 80061 LIPID PANEL: CPT

## 2022-07-06 PROCEDURE — 84443 ASSAY THYROID STIM HORMONE: CPT

## 2022-07-06 PROCEDURE — 83721 ASSAY OF BLOOD LIPOPROTEIN: CPT

## 2022-07-06 PROCEDURE — 36415 COLL VENOUS BLD VENIPUNCTURE: CPT

## 2022-09-07 ENCOUNTER — HOSPITAL ENCOUNTER (OUTPATIENT)
Dept: HOSPITAL 76 - LAB.S | Age: 57
Discharge: HOME | End: 2022-09-07
Attending: EMERGENCY MEDICINE
Payer: COMMERCIAL

## 2022-09-07 DIAGNOSIS — R30.0: Primary | ICD-10-CM

## 2022-09-07 LAB
CLARITY UR REFRACT.AUTO: CLEAR
GLUCOSE UR QL STRIP.AUTO: NEGATIVE MG/DL
KETONES UR QL STRIP.AUTO: NEGATIVE MG/DL
NITRITE UR QL STRIP.AUTO: NEGATIVE
PH UR STRIP.AUTO: 7 PH (ref 5–7.5)
PROT UR STRIP.AUTO-MCNC: NEGATIVE MG/DL
RBC # UR STRIP.AUTO: (no result) /UL
RBC # URNS HPF: (no result) /HPF (ref 0–5)
SP GR UR STRIP.AUTO: 1.01 (ref 1–1.03)
SQUAMOUS URNS QL MICRO: (no result)
UROBILINOGEN UR QL STRIP.AUTO: (no result) E.U./DL
UROBILINOGEN UR STRIP.AUTO-MCNC: NEGATIVE MG/DL
WBC # UR MANUAL: (no result) /HPF (ref 0–5)

## 2022-09-07 PROCEDURE — 81001 URINALYSIS AUTO W/SCOPE: CPT

## 2022-09-07 PROCEDURE — 87086 URINE CULTURE/COLONY COUNT: CPT

## 2022-10-05 ENCOUNTER — HOSPITAL ENCOUNTER (OUTPATIENT)
Dept: HOSPITAL 76 - LAB.S | Age: 57
Discharge: HOME | End: 2022-10-05
Attending: INTERNAL MEDICINE
Payer: COMMERCIAL

## 2022-10-05 DIAGNOSIS — N76.0: Primary | ICD-10-CM

## 2022-10-05 LAB
BV DNA PNL VAG NAA+PROBE: NEGATIVE
C GLABRATA DNA BLD QL NAA+PROBE: NEGATIVE
C KRUSEI DNA VAG QL NAA+PROBE: NEGATIVE
CANDIDA DNA VAG QL NAA+PROBE: NEGATIVE
T VAGINALIS RRNA GENITAL QL PROBE: NEGATIVE

## 2022-10-05 PROCEDURE — 81514 NFCT DS BV&VAGINITIS DNA ALG: CPT

## 2022-10-13 ENCOUNTER — HOSPITAL ENCOUNTER (OUTPATIENT)
Dept: HOSPITAL 76 - LAB | Age: 57
Discharge: HOME | End: 2022-10-13
Attending: INTERNAL MEDICINE
Payer: COMMERCIAL

## 2022-10-13 DIAGNOSIS — M05.79: Primary | ICD-10-CM

## 2022-10-13 LAB
ALBUMIN DIAFP-MCNC: 4 G/DL (ref 3.2–5.5)
ALBUMIN/GLOB SERPL: 1.7 {RATIO} (ref 1–2.2)
ALP SERPL-CCNC: 43 IU/L (ref 42–121)
ALT SERPL W P-5'-P-CCNC: 11 IU/L (ref 10–60)
ANION GAP SERPL CALCULATED.4IONS-SCNC: 7 MMOL/L (ref 6–13)
AST SERPL W P-5'-P-CCNC: 20 IU/L (ref 10–42)
BASOPHILS NFR BLD AUTO: 0 10^3/UL (ref 0–0.1)
BASOPHILS NFR BLD AUTO: 0.5 %
BILIRUB BLD-MCNC: 0.2 MG/DL (ref 0.2–1)
BUN SERPL-MCNC: 20 MG/DL (ref 6–20)
CALCIUM UR-MCNC: 9.7 MG/DL (ref 8.5–10.3)
CHLORIDE SERPL-SCNC: 101 MMOL/L (ref 101–111)
CO2 SERPL-SCNC: 30 MMOL/L (ref 21–32)
CREAT SERPLBLD-SCNC: 0.9 MG/DL (ref 0.4–1)
EOSINOPHIL # BLD AUTO: 0 10^3/UL (ref 0–0.7)
EOSINOPHIL NFR BLD AUTO: 1.1 %
ERYTHROCYTE [DISTWIDTH] IN BLOOD BY AUTOMATED COUNT: 12.5 % (ref 12–15)
GFRSERPLBLD MDRD-ARVRAT: 64 ML/MIN/{1.73_M2} (ref 89–?)
GLOBULIN SER-MCNC: 2.4 G/DL (ref 2.1–4.2)
GLUCOSE SERPL-MCNC: 95 MG/DL (ref 70–100)
HCT VFR BLD AUTO: 38 % (ref 37–47)
HGB UR QL STRIP: 12.4 G/DL (ref 12–16)
LYMPHOCYTES # SPEC AUTO: 1.3 10^3/UL (ref 1.5–3.5)
LYMPHOCYTES NFR BLD AUTO: 33.4 %
MCH RBC QN AUTO: 30.2 PG (ref 27–31)
MCHC RBC AUTO-ENTMCNC: 32.6 G/DL (ref 32–36)
MCV RBC AUTO: 92.5 FL (ref 81–99)
MONOCYTES # BLD AUTO: 0.3 10^3/UL (ref 0–1)
MONOCYTES NFR BLD AUTO: 8.7 %
NEUTROPHILS # BLD AUTO: 2.1 10^3/UL (ref 1.5–6.6)
NEUTROPHILS # SNV AUTO: 3.8 X10^3/UL (ref 4.8–10.8)
NEUTROPHILS NFR BLD AUTO: 56 %
NRBC # BLD AUTO: 0 /100WBC
NRBC # BLD AUTO: 0 X10^3/UL
PDW BLD AUTO: 10.5 FL (ref 7.9–10.8)
PLATELET # BLD: 217 10^3/UL (ref 130–450)
POTASSIUM SERPL-SCNC: 4.9 MMOL/L (ref 3.5–5)
PROT SPEC-MCNC: 6.4 G/DL (ref 6.7–8.2)
RBC MAR: 4.11 10^6/UL (ref 4.2–5.4)
SODIUM SERPLBLD-SCNC: 138 MMOL/L (ref 135–145)

## 2022-10-13 PROCEDURE — 80053 COMPREHEN METABOLIC PANEL: CPT

## 2022-10-13 PROCEDURE — 36415 COLL VENOUS BLD VENIPUNCTURE: CPT

## 2022-10-13 PROCEDURE — 85651 RBC SED RATE NONAUTOMATED: CPT

## 2022-10-13 PROCEDURE — 85025 COMPLETE CBC W/AUTO DIFF WBC: CPT

## 2022-12-06 ENCOUNTER — HOSPITAL ENCOUNTER (OUTPATIENT)
Dept: HOSPITAL 76 - LAB | Age: 57
Discharge: HOME | End: 2022-12-06
Attending: INTERNAL MEDICINE
Payer: COMMERCIAL

## 2022-12-06 DIAGNOSIS — D72.819: Primary | ICD-10-CM

## 2022-12-06 LAB
BASOPHILS NFR BLD AUTO: 0 10^3/UL (ref 0–0.1)
BASOPHILS NFR BLD AUTO: 0.3 %
EOSINOPHIL # BLD AUTO: 0.1 10^3/UL (ref 0–0.7)
EOSINOPHIL NFR BLD AUTO: 1.2 %
ERYTHROCYTE [DISTWIDTH] IN BLOOD BY AUTOMATED COUNT: 12.6 % (ref 12–15)
HCT VFR BLD AUTO: 38.6 % (ref 37–47)
HGB UR QL STRIP: 12.6 G/DL (ref 12–16)
LYMPHOCYTES # SPEC AUTO: 1.3 10^3/UL (ref 1.5–3.5)
LYMPHOCYTES NFR BLD AUTO: 20.1 %
MCH RBC QN AUTO: 29.9 PG (ref 27–31)
MCHC RBC AUTO-ENTMCNC: 32.6 G/DL (ref 32–36)
MCV RBC AUTO: 91.7 FL (ref 81–99)
MONOCYTES # BLD AUTO: 0.8 10^3/UL (ref 0–1)
MONOCYTES NFR BLD AUTO: 11.3 %
NEUTROPHILS # BLD AUTO: 4.4 10^3/UL (ref 1.5–6.6)
NEUTROPHILS # SNV AUTO: 6.6 X10^3/UL (ref 4.8–10.8)
NEUTROPHILS NFR BLD AUTO: 66.6 %
NRBC # BLD AUTO: 0 /100WBC
NRBC # BLD AUTO: 0 X10^3/UL
PDW BLD AUTO: 10 FL (ref 7.9–10.8)
PLATELET # BLD: 274 10^3/UL (ref 130–450)
RBC MAR: 4.21 10^6/UL (ref 4.2–5.4)

## 2022-12-06 PROCEDURE — 36415 COLL VENOUS BLD VENIPUNCTURE: CPT

## 2022-12-06 PROCEDURE — 85025 COMPLETE CBC W/AUTO DIFF WBC: CPT

## 2023-02-15 ENCOUNTER — HOSPITAL ENCOUNTER (OUTPATIENT)
Dept: HOSPITAL 76 - LAB | Age: 58
Discharge: HOME | End: 2023-02-15
Attending: INTERNAL MEDICINE
Payer: COMMERCIAL

## 2023-02-15 DIAGNOSIS — M05.79: Primary | ICD-10-CM

## 2023-02-15 LAB
ALBUMIN DIAFP-MCNC: 3.6 G/DL (ref 3.2–5.5)
ALBUMIN/GLOB SERPL: 1.4 {RATIO} (ref 1–2.2)
ALP SERPL-CCNC: 45 IU/L (ref 42–121)
ALT SERPL W P-5'-P-CCNC: 16 IU/L (ref 10–60)
ANION GAP SERPL CALCULATED.4IONS-SCNC: 7 MMOL/L (ref 6–13)
AST SERPL W P-5'-P-CCNC: 17 IU/L (ref 10–42)
BASOPHILS NFR BLD AUTO: 0 10^3/UL (ref 0–0.1)
BASOPHILS NFR BLD AUTO: 0.2 %
BILIRUB BLD-MCNC: 0.3 MG/DL (ref 0.2–1)
BUN SERPL-MCNC: 30 MG/DL (ref 6–20)
CALCIUM UR-MCNC: 9 MG/DL (ref 8.5–10.3)
CHLORIDE SERPL-SCNC: 99 MMOL/L (ref 101–111)
CO2 SERPL-SCNC: 28 MMOL/L (ref 21–32)
CREAT SERPLBLD-SCNC: 0.7 MG/DL (ref 0.4–1)
EOSINOPHIL # BLD AUTO: 0 10^3/UL (ref 0–0.7)
EOSINOPHIL NFR BLD AUTO: 0.7 %
ERYTHROCYTE [DISTWIDTH] IN BLOOD BY AUTOMATED COUNT: 12.7 % (ref 12–15)
GFRSERPLBLD MDRD-ARVRAT: 86 ML/MIN/{1.73_M2} (ref 89–?)
GLOBULIN SER-MCNC: 2.6 G/DL (ref 2.1–4.2)
GLUCOSE SERPL-MCNC: 85 MG/DL (ref 70–100)
HCT VFR BLD AUTO: 37.4 % (ref 37–47)
HGB UR QL STRIP: 12.1 G/DL (ref 12–16)
LYMPHOCYTES # SPEC AUTO: 1.2 10^3/UL (ref 1.5–3.5)
LYMPHOCYTES NFR BLD AUTO: 29.9 %
MCH RBC QN AUTO: 30.2 PG (ref 27–31)
MCHC RBC AUTO-ENTMCNC: 32.4 G/DL (ref 32–36)
MCV RBC AUTO: 93.3 FL (ref 81–99)
MONOCYTES # BLD AUTO: 0.4 10^3/UL (ref 0–1)
MONOCYTES NFR BLD AUTO: 10.6 %
NEUTROPHILS # BLD AUTO: 2.4 10^3/UL (ref 1.5–6.6)
NEUTROPHILS # SNV AUTO: 4.2 X10^3/UL (ref 4.8–10.8)
NEUTROPHILS NFR BLD AUTO: 58.4 %
NRBC # BLD AUTO: 0 /100WBC
NRBC # BLD AUTO: 0 X10^3/UL
PDW BLD AUTO: 9.9 FL (ref 7.9–10.8)
PLATELET # BLD: 210 10^3/UL (ref 130–450)
POTASSIUM SERPL-SCNC: 4.5 MMOL/L (ref 3.5–5)
PROT SPEC-MCNC: 6.2 G/DL (ref 6.7–8.2)
RBC MAR: 4.01 10^6/UL (ref 4.2–5.4)
SODIUM SERPLBLD-SCNC: 134 MMOL/L (ref 135–145)

## 2023-02-15 PROCEDURE — 85025 COMPLETE CBC W/AUTO DIFF WBC: CPT

## 2023-02-15 PROCEDURE — 85651 RBC SED RATE NONAUTOMATED: CPT

## 2023-02-15 PROCEDURE — 36415 COLL VENOUS BLD VENIPUNCTURE: CPT

## 2023-02-15 PROCEDURE — 80053 COMPREHEN METABOLIC PANEL: CPT

## 2023-03-06 ENCOUNTER — HOSPITAL ENCOUNTER (OUTPATIENT)
Dept: HOSPITAL 76 - LAB | Age: 58
Discharge: HOME | End: 2023-03-06
Attending: INTERNAL MEDICINE
Payer: COMMERCIAL

## 2023-03-06 DIAGNOSIS — E06.9: ICD-10-CM

## 2023-03-06 DIAGNOSIS — E78.5: Primary | ICD-10-CM

## 2023-03-06 LAB
CHOLEST SERPL-MCNC: 284 MG/DL
HDLC SERPL-MCNC: 76 MG/DL
HDLC SERPL: 3.7 {RATIO} (ref ?–4.4)
LDLC SERPL CALC-MCNC: 183 MG/DL
LDLC/HDLC SERPL: 2.4 {RATIO} (ref ?–4.4)
TRIGL P FAST SERPL-MCNC: 123 MG/DL
TSH SERPL-ACNC: 0.8 UIU/ML (ref 0.34–5.6)
VLDLC SERPL-SCNC: 25 MG/DL

## 2023-03-06 PROCEDURE — 80061 LIPID PANEL: CPT

## 2023-03-06 PROCEDURE — 36415 COLL VENOUS BLD VENIPUNCTURE: CPT

## 2023-03-06 PROCEDURE — 84443 ASSAY THYROID STIM HORMONE: CPT

## 2023-03-06 PROCEDURE — 83721 ASSAY OF BLOOD LIPOPROTEIN: CPT

## 2023-04-07 ENCOUNTER — HOSPITAL ENCOUNTER (OUTPATIENT)
Dept: HOSPITAL 76 - LAB.S | Age: 58
Discharge: HOME | End: 2023-04-07
Attending: EMERGENCY MEDICINE
Payer: COMMERCIAL

## 2023-04-07 DIAGNOSIS — N39.0: Primary | ICD-10-CM

## 2023-04-07 LAB
CLARITY UR REFRACT.AUTO: CLEAR
GLUCOSE UR QL STRIP.AUTO: NEGATIVE MG/DL
KETONES UR QL STRIP.AUTO: NEGATIVE MG/DL
NITRITE UR QL STRIP.AUTO: NEGATIVE
PH UR STRIP.AUTO: 6 PH (ref 5–7.5)
PROT UR STRIP.AUTO-MCNC: NEGATIVE MG/DL
RBC # UR STRIP.AUTO: (no result) /UL
RBC # URNS HPF: (no result) /HPF (ref 0–5)
SP GR UR STRIP.AUTO: 1.02 (ref 1–1.03)
SQUAMOUS URNS QL MICRO: (no result)
UROBILINOGEN UR QL STRIP.AUTO: (no result) E.U./DL
UROBILINOGEN UR STRIP.AUTO-MCNC: NEGATIVE MG/DL
WBC # UR MANUAL: (no result) /HPF (ref 0–5)

## 2023-04-07 PROCEDURE — 87077 CULTURE AEROBIC IDENTIFY: CPT

## 2023-04-07 PROCEDURE — 87181 SC STD AGAR DILUTION PER AGT: CPT

## 2023-04-07 PROCEDURE — 87086 URINE CULTURE/COLONY COUNT: CPT

## 2023-04-07 PROCEDURE — 81001 URINALYSIS AUTO W/SCOPE: CPT

## 2023-04-24 ENCOUNTER — HOSPITAL ENCOUNTER (OUTPATIENT)
Dept: HOSPITAL 76 - LAB | Age: 58
Discharge: HOME | End: 2023-04-24
Attending: INTERNAL MEDICINE
Payer: COMMERCIAL

## 2023-04-24 DIAGNOSIS — M05.79: Primary | ICD-10-CM

## 2023-04-24 LAB
ALBUMIN DIAFP-MCNC: 4 G/DL (ref 3.2–5.5)
ALBUMIN/GLOB SERPL: 1.7 {RATIO} (ref 1–2.2)
ALP SERPL-CCNC: 50 IU/L (ref 42–121)
ALT SERPL W P-5'-P-CCNC: 17 IU/L (ref 10–60)
ANION GAP SERPL CALCULATED.4IONS-SCNC: 4 MMOL/L (ref 6–13)
AST SERPL W P-5'-P-CCNC: 22 IU/L (ref 10–42)
BASOPHILS NFR BLD AUTO: 0 10^3/UL (ref 0–0.1)
BASOPHILS NFR BLD AUTO: 0.3 %
BILIRUB BLD-MCNC: 0.3 MG/DL (ref 0.2–1)
BUN SERPL-MCNC: 25 MG/DL (ref 6–20)
CALCIUM UR-MCNC: 8.9 MG/DL (ref 8.5–10.3)
CHLORIDE SERPL-SCNC: 105 MMOL/L (ref 101–111)
CO2 SERPL-SCNC: 29 MMOL/L (ref 21–32)
CREAT SERPLBLD-SCNC: 0.7 MG/DL (ref 0.4–1)
EOSINOPHIL # BLD AUTO: 0 10^3/UL (ref 0–0.7)
EOSINOPHIL NFR BLD AUTO: 0.8 %
ERYTHROCYTE [DISTWIDTH] IN BLOOD BY AUTOMATED COUNT: 12.4 % (ref 12–15)
GFRSERPLBLD MDRD-ARVRAT: 86 ML/MIN/{1.73_M2} (ref 89–?)
GLOBULIN SER-MCNC: 2.4 G/DL (ref 2.1–4.2)
GLUCOSE SERPL-MCNC: 203 MG/DL (ref 70–100)
HCT VFR BLD AUTO: 38.7 % (ref 37–47)
HGB UR QL STRIP: 12.6 G/DL (ref 12–16)
LYMPHOCYTES # SPEC AUTO: 1.2 10^3/UL (ref 1.5–3.5)
LYMPHOCYTES NFR BLD AUTO: 34 %
MCH RBC QN AUTO: 30.2 PG (ref 27–31)
MCHC RBC AUTO-ENTMCNC: 32.6 G/DL (ref 32–36)
MCV RBC AUTO: 92.8 FL (ref 81–99)
MONOCYTES # BLD AUTO: 0.3 10^3/UL (ref 0–1)
MONOCYTES NFR BLD AUTO: 7.7 %
NEUTROPHILS # BLD AUTO: 2.1 10^3/UL (ref 1.5–6.6)
NEUTROPHILS # SNV AUTO: 3.6 X10^3/UL (ref 4.8–10.8)
NEUTROPHILS NFR BLD AUTO: 57.2 %
NRBC # BLD AUTO: 0 /100WBC
NRBC # BLD AUTO: 0 X10^3/UL
PDW BLD AUTO: 10.1 FL (ref 7.9–10.8)
PLATELET # BLD: 221 10^3/UL (ref 130–450)
POTASSIUM SERPL-SCNC: 3.6 MMOL/L (ref 3.5–5)
PROT SPEC-MCNC: 6.4 G/DL (ref 6.7–8.2)
RBC MAR: 4.17 10^6/UL (ref 4.2–5.4)
SODIUM SERPLBLD-SCNC: 138 MMOL/L (ref 135–145)

## 2023-04-24 PROCEDURE — 80053 COMPREHEN METABOLIC PANEL: CPT

## 2023-04-24 PROCEDURE — 85025 COMPLETE CBC W/AUTO DIFF WBC: CPT

## 2023-04-24 PROCEDURE — 36415 COLL VENOUS BLD VENIPUNCTURE: CPT

## 2023-04-24 PROCEDURE — 85651 RBC SED RATE NONAUTOMATED: CPT

## 2023-06-22 ENCOUNTER — HOSPITAL ENCOUNTER (OUTPATIENT)
Dept: HOSPITAL 76 - DI | Age: 58
Discharge: HOME | End: 2023-06-22
Attending: NURSE PRACTITIONER
Payer: COMMERCIAL

## 2023-06-22 DIAGNOSIS — Z12.31: Primary | ICD-10-CM

## 2023-06-23 NOTE — MAMMOGRAPHY REPORT
BILATERAL DIGITAL SCREENING MAMMOGRAM 3D/2D: 6/22/2023

 

CLINICAL: Routine screening.  

 

Comparison is made to exams dated:  5/22/2022 mammogram, 4/19/2021 mammogram, 9/10/2020 mammogram, 7/
8/2020 mammogram - [facility], and 2/27/2019 mammogram - [facility].  

 

Both breasts are heterogeneously dense, which may obscure small masses (category c / 51-75% glandular
 tissue).  

 

There are stable benign calcifications in the left breast.  

No significant masses, calcifications, or other findings are seen in either breast.  

There has been no significant interval change.

 

IMPRESSION: BENIGN

There is no mammographic evidence of malignancy. A 1 year screening mammogram is recommended.  

 

Based on the Tyrer Cuzick model (a risk assessment model) the patients lifetime risk is 9.4% and her
 10 year risk is 3.6%. According to the ACR, ACS, and NCCN guidelines, an annual breast MRI exam valerie
g with mammogram is recommended if the patients lifetime risk is 20% or greater.

 

 

This exam was interpreted at Station ID: [id number].  

 

NOTE: For mammograms, a report in lay terms will be sent to the patient. Approximately 15% of breast 
malignancies will not be visualized mammographically. In the management of a palpable breast mass, a 
negative mammogram must not discourage biopsy of a clinically suspicious lesion.

 

Electronically Signed By: Sly potts/easton:6/22/2023 12:11:24  

 

 

letter sent: No_Letter  

ACR BI-RADS Category 2: Benign Finding(s) 3342F

PARENCHYMAL PATTERN: (D) - The breast(s) demonstrate(s) heterogeneously dense fibroglandular reji peres.

BI-RADS CATEGORY: (2) - 2

Mammogram

[id number]

1 year screening

LATERALITY: (B)

## 2023-07-10 ENCOUNTER — HOSPITAL ENCOUNTER (OUTPATIENT)
Dept: HOSPITAL 76 - DI.S | Age: 58
Discharge: HOME | End: 2023-07-10
Attending: PHYSICIAN ASSISTANT
Payer: COMMERCIAL

## 2023-07-10 DIAGNOSIS — R07.89: Primary | ICD-10-CM

## 2023-07-10 NOTE — XRAY REPORT
PROCEDURE:  Chest 2 View X-Ray

 

INDICATIONS:  CHEST TIGHTNESS, COVID+

 

TECHNIQUE:  2 views of the chest were acquired.  

 

COMPARISON:  Chest radiograph 9/27/2020

 

FINDINGS:  

 

Surgical changes and devices:  None.  

 

Lungs and pleura:  No pleural effusions or pneumothorax.  Lungs are clear.   Lungs appear hyperinflat
ed, finding that can be seen in the setting of COPD.

 

Mediastinum:  Mediastinal contours appear normal.  Heart size is normal.  

 

Bones and chest wall:  No suspicious bony lesions.  Overlying soft tissues appear unremarkable.  

 

 

IMPRESSION:  

 

No acute cardiopulmonary process.

 

 

 

Reviewed by: Maxwell Scales MD on 7/10/2023 6:24 PM PDT

Approved by: Maxwell Scales MD on 7/10/2023 6:24 PM PDT

 

 

Station ID:  [id number]

## 2023-09-06 ENCOUNTER — HOSPITAL ENCOUNTER (OUTPATIENT)
Dept: HOSPITAL 76 - LAB | Age: 58
Discharge: HOME | End: 2023-09-06
Attending: INTERNAL MEDICINE
Payer: COMMERCIAL

## 2023-09-06 DIAGNOSIS — M06.9: ICD-10-CM

## 2023-09-06 DIAGNOSIS — D72.819: Primary | ICD-10-CM

## 2023-09-06 LAB
BASOPHILS NFR BLD AUTO: 0 10^3/UL (ref 0–0.1)
BASOPHILS NFR BLD AUTO: 0.4 %
EOSINOPHIL # BLD AUTO: 0.1 10^3/UL (ref 0–0.7)
EOSINOPHIL NFR BLD AUTO: 1.9 %
ERYTHROCYTE [DISTWIDTH] IN BLOOD BY AUTOMATED COUNT: 12.5 % (ref 12–15)
HCT VFR BLD AUTO: 38.1 % (ref 37–47)
HGB UR QL STRIP: 12.6 G/DL (ref 12–16)
LYMPHOCYTES # SPEC AUTO: 1.1 10^3/UL (ref 1.5–3.5)
LYMPHOCYTES NFR BLD AUTO: 40 %
MCH RBC QN AUTO: 30.5 PG (ref 27–31)
MCHC RBC AUTO-ENTMCNC: 33.1 G/DL (ref 32–36)
MCV RBC AUTO: 92.3 FL (ref 81–99)
MONOCYTES # BLD AUTO: 0.3 10^3/UL (ref 0–1)
MONOCYTES NFR BLD AUTO: 10.7 %
NEUTROPHILS # BLD AUTO: 1.3 10^3/UL (ref 1.5–6.6)
NEUTROPHILS # SNV AUTO: 2.7 X10^3/UL (ref 4.8–10.8)
NEUTROPHILS NFR BLD AUTO: 47 %
NRBC # BLD AUTO: 0 /100WBC
NRBC # BLD AUTO: 0 X10^3/UL
PDW BLD AUTO: 10.4 FL (ref 7.9–10.8)
PLATELET # BLD: 224 10^3/UL (ref 130–450)
RBC MAR: 4.13 10^6/UL (ref 4.2–5.4)
RBC MORPH BLD: (no result)

## 2023-09-06 PROCEDURE — 86225 DNA ANTIBODY NATIVE: CPT

## 2023-09-06 PROCEDURE — 36415 COLL VENOUS BLD VENIPUNCTURE: CPT

## 2023-09-06 PROCEDURE — 85025 COMPLETE CBC W/AUTO DIFF WBC: CPT

## 2023-11-24 ENCOUNTER — HOSPITAL ENCOUNTER (OUTPATIENT)
Dept: HOSPITAL 76 - LAB | Age: 58
Discharge: HOME | End: 2023-11-24
Attending: INTERNAL MEDICINE
Payer: COMMERCIAL

## 2023-11-24 DIAGNOSIS — M05.79: Primary | ICD-10-CM

## 2023-11-24 LAB
ALBUMIN DIAFP-MCNC: 4.5 G/DL (ref 3.2–5.5)
ALBUMIN/GLOB SERPL: 2.4 {RATIO} (ref 1–2.2)
ALP SERPL-CCNC: 94 IU/L (ref 42–121)
ALT SERPL W P-5'-P-CCNC: 15 IU/L (ref 10–60)
ANION GAP SERPL CALCULATED.4IONS-SCNC: 6 MMOL/L (ref 6–13)
AST SERPL W P-5'-P-CCNC: 21 IU/L (ref 10–42)
BASOPHILS NFR BLD AUTO: 0 10^3/UL (ref 0–0.1)
BASOPHILS NFR BLD AUTO: 0.2 %
BILIRUB BLD-MCNC: 0.2 MG/DL (ref 0.2–1)
BUN SERPL-MCNC: 22 MG/DL (ref 6–20)
CALCIUM UR-MCNC: 9.5 MG/DL (ref 8.5–10.3)
CHLORIDE SERPL-SCNC: 100 MMOL/L (ref 101–111)
CO2 SERPL-SCNC: 32 MMOL/L (ref 21–32)
CREAT SERPLBLD-SCNC: 0.7 MG/DL (ref 0.6–1.3)
EOSINOPHIL # BLD AUTO: 0.1 10^3/UL (ref 0–0.7)
EOSINOPHIL NFR BLD AUTO: 1.4 %
ERYTHROCYTE [DISTWIDTH] IN BLOOD BY AUTOMATED COUNT: 12.8 % (ref 12–15)
GFRSERPLBLD MDRD-ARVRAT: 86 ML/MIN/{1.73_M2} (ref 89–?)
GLOBULIN SER-MCNC: 1.9 G/DL (ref 2.1–4.2)
GLUCOSE SERPL-MCNC: 138 MG/DL (ref 74–104)
HCT VFR BLD AUTO: 37.5 % (ref 37–47)
HGB UR QL STRIP: 11.9 G/DL (ref 12–16)
LYMPHOCYTES # SPEC AUTO: 1.1 10^3/UL (ref 1.5–3.5)
LYMPHOCYTES NFR BLD AUTO: 25.4 %
MCH RBC QN AUTO: 29.9 PG (ref 27–31)
MCHC RBC AUTO-ENTMCNC: 31.7 G/DL (ref 32–36)
MCV RBC AUTO: 94.2 FL (ref 81–99)
MONOCYTES # BLD AUTO: 0.3 10^3/UL (ref 0–1)
MONOCYTES NFR BLD AUTO: 6.9 %
NEUTROPHILS # BLD AUTO: 2.8 10^3/UL (ref 1.5–6.6)
NEUTROPHILS # SNV AUTO: 4.2 X10^3/UL (ref 4.8–10.8)
NEUTROPHILS NFR BLD AUTO: 65.9 %
NRBC # BLD AUTO: 0 /100WBC
NRBC # BLD AUTO: 0 X10^3/UL
PDW BLD AUTO: 9.5 FL (ref 7.9–10.8)
PLATELET # BLD: 331 10^3/UL (ref 130–450)
POTASSIUM SERPL-SCNC: 3.9 MMOL/L (ref 3.5–4.5)
PROT SPEC-MCNC: 6.4 G/DL (ref 6.4–8.9)
RBC MAR: 3.98 10^6/UL (ref 4.2–5.4)
SODIUM SERPLBLD-SCNC: 138 MMOL/L (ref 135–145)

## 2023-11-24 PROCEDURE — 80053 COMPREHEN METABOLIC PANEL: CPT

## 2023-11-24 PROCEDURE — 36415 COLL VENOUS BLD VENIPUNCTURE: CPT

## 2023-11-24 PROCEDURE — 85651 RBC SED RATE NONAUTOMATED: CPT

## 2023-11-24 PROCEDURE — 85025 COMPLETE CBC W/AUTO DIFF WBC: CPT

## 2023-12-02 ENCOUNTER — HOSPITAL ENCOUNTER (OUTPATIENT)
Dept: HOSPITAL 76 - LAB.S | Age: 58
Discharge: HOME | End: 2023-12-02
Attending: NURSE PRACTITIONER
Payer: COMMERCIAL

## 2023-12-02 DIAGNOSIS — H00.016: Primary | ICD-10-CM

## 2023-12-02 PROCEDURE — 87205 SMEAR GRAM STAIN: CPT

## 2023-12-02 PROCEDURE — 87070 CULTURE OTHR SPECIMN AEROBIC: CPT

## 2023-12-21 ENCOUNTER — HOSPITAL ENCOUNTER (OUTPATIENT)
Dept: HOSPITAL 76 - LAB | Age: 58
Discharge: HOME | End: 2023-12-21
Attending: INTERNAL MEDICINE
Payer: COMMERCIAL

## 2023-12-21 DIAGNOSIS — D72.819: Primary | ICD-10-CM

## 2023-12-21 LAB
BASOPHILS NFR BLD AUTO: 0 10^3/UL (ref 0–0.1)
BASOPHILS NFR BLD AUTO: 0.2 %
EOSINOPHIL # BLD AUTO: 0 10^3/UL (ref 0–0.7)
EOSINOPHIL NFR BLD AUTO: 0.9 %
ERYTHROCYTE [DISTWIDTH] IN BLOOD BY AUTOMATED COUNT: 12.9 % (ref 12–15)
HCT VFR BLD AUTO: 38.2 % (ref 37–47)
HGB UR QL STRIP: 12.6 G/DL (ref 12–16)
LYMPHOCYTES # SPEC AUTO: 1.4 10^3/UL (ref 1.5–3.5)
LYMPHOCYTES NFR BLD AUTO: 33.1 %
MCH RBC QN AUTO: 30.6 PG (ref 27–31)
MCHC RBC AUTO-ENTMCNC: 33 G/DL (ref 32–36)
MCV RBC AUTO: 92.7 FL (ref 81–99)
MONOCYTES # BLD AUTO: 0.3 10^3/UL (ref 0–1)
MONOCYTES NFR BLD AUTO: 7.3 %
NEUTROPHILS # BLD AUTO: 2.5 10^3/UL (ref 1.5–6.6)
NEUTROPHILS # SNV AUTO: 4.2 X10^3/UL (ref 4.8–10.8)
NEUTROPHILS NFR BLD AUTO: 58.5 %
NRBC # BLD AUTO: 0 /100WBC
NRBC # BLD AUTO: 0 X10^3/UL
PDW BLD AUTO: 9.8 FL (ref 7.9–10.8)
PLATELET # BLD: 261 10^3/UL (ref 130–450)
RBC MAR: 4.12 10^6/UL (ref 4.2–5.4)

## 2023-12-21 PROCEDURE — 36415 COLL VENOUS BLD VENIPUNCTURE: CPT

## 2023-12-21 PROCEDURE — 85025 COMPLETE CBC W/AUTO DIFF WBC: CPT

## 2024-02-15 ENCOUNTER — HOSPITAL ENCOUNTER (OUTPATIENT)
Dept: HOSPITAL 76 - LAB | Age: 59
Discharge: HOME | End: 2024-02-15
Attending: INTERNAL MEDICINE
Payer: COMMERCIAL

## 2024-02-15 DIAGNOSIS — M06.9: Primary | ICD-10-CM

## 2024-02-15 LAB
ALBUMIN DIAFP-MCNC: 4.4 G/DL (ref 3.2–5.5)
ALBUMIN/GLOB SERPL: 2.2 {RATIO} (ref 1–2.2)
ALP SERPL-CCNC: 64 IU/L (ref 42–121)
ALT SERPL W P-5'-P-CCNC: 16 IU/L (ref 10–60)
ANION GAP SERPL CALCULATED.4IONS-SCNC: 4 MMOL/L (ref 6–13)
AST SERPL W P-5'-P-CCNC: 19 IU/L (ref 10–42)
BASOPHILS NFR BLD AUTO: 0 10^3/UL (ref 0–0.1)
BASOPHILS NFR BLD AUTO: 0.5 %
BILIRUB BLD-MCNC: 0.2 MG/DL (ref 0.2–1)
BUN SERPL-MCNC: 28 MG/DL (ref 6–20)
CALCIUM UR-MCNC: 9.4 MG/DL (ref 8.5–10.3)
CHLORIDE SERPL-SCNC: 101 MMOL/L (ref 101–111)
CO2 SERPL-SCNC: 32 MMOL/L (ref 21–32)
CREAT SERPLBLD-SCNC: 0.9 MG/DL (ref 0.6–1.3)
EOSINOPHIL # BLD AUTO: 0.1 10^3/UL (ref 0–0.7)
EOSINOPHIL NFR BLD AUTO: 2 %
ERYTHROCYTE [DISTWIDTH] IN BLOOD BY AUTOMATED COUNT: 12.4 % (ref 12–15)
GFRSERPLBLD MDRD-ARVRAT: 64 ML/MIN/{1.73_M2} (ref 89–?)
GLOBULIN SER-MCNC: 2 G/DL (ref 2.1–4.2)
GLUCOSE SERPL-MCNC: 130 MG/DL (ref 74–104)
HCT VFR BLD AUTO: 40 % (ref 37–47)
HGB UR QL STRIP: 13 G/DL (ref 12–16)
LYMPHOCYTES # SPEC AUTO: 1 10^3/UL (ref 1.5–3.5)
LYMPHOCYTES NFR BLD AUTO: 25.4 %
MCH RBC QN AUTO: 30.2 PG (ref 27–31)
MCHC RBC AUTO-ENTMCNC: 32.5 G/DL (ref 32–36)
MCV RBC AUTO: 92.8 FL (ref 81–99)
MONOCYTES # BLD AUTO: 0.4 10^3/UL (ref 0–1)
MONOCYTES NFR BLD AUTO: 8.8 %
NEUTROPHILS # BLD AUTO: 2.6 10^3/UL (ref 1.5–6.6)
NEUTROPHILS # SNV AUTO: 4.1 X10^3/UL (ref 4.8–10.8)
NEUTROPHILS NFR BLD AUTO: 63.1 %
NRBC # BLD AUTO: 0 /100WBC
NRBC # BLD AUTO: 0 X10^3/UL
PDW BLD AUTO: 10.4 FL (ref 7.9–10.8)
PLATELET # BLD: 215 10^3/UL (ref 130–450)
POTASSIUM SERPL-SCNC: 4.2 MMOL/L (ref 3.5–4.5)
PROT SPEC-MCNC: 6.4 G/DL (ref 6.4–8.9)
RBC MAR: 4.31 10^6/UL (ref 4.2–5.4)
SODIUM SERPLBLD-SCNC: 137 MMOL/L (ref 135–145)

## 2024-02-15 PROCEDURE — 85651 RBC SED RATE NONAUTOMATED: CPT

## 2024-02-15 PROCEDURE — 80053 COMPREHEN METABOLIC PANEL: CPT

## 2024-02-15 PROCEDURE — 85025 COMPLETE CBC W/AUTO DIFF WBC: CPT

## 2024-02-15 PROCEDURE — 36415 COLL VENOUS BLD VENIPUNCTURE: CPT

## 2024-05-08 ENCOUNTER — HOSPITAL ENCOUNTER (OUTPATIENT)
Dept: HOSPITAL 76 - LAB | Age: 59
Discharge: HOME | End: 2024-05-08
Attending: INTERNAL MEDICINE
Payer: COMMERCIAL

## 2024-05-08 DIAGNOSIS — M05.79: Primary | ICD-10-CM

## 2024-05-08 LAB
ALBUMIN DIAFP-MCNC: 4.3 G/DL (ref 3.2–5.5)
ALBUMIN/GLOB SERPL: 2.4 {RATIO} (ref 1–2.2)
ALP SERPL-CCNC: 58 IU/L (ref 42–121)
ALT SERPL W P-5'-P-CCNC: 14 IU/L (ref 10–60)
ANION GAP SERPL CALCULATED.4IONS-SCNC: 5 MMOL/L (ref 6–13)
AST SERPL W P-5'-P-CCNC: 20 IU/L (ref 10–42)
BASOPHILS NFR BLD AUTO: 0 10^3/UL (ref 0–0.1)
BASOPHILS NFR BLD AUTO: 0.3 %
BILIRUB BLD-MCNC: 0.3 MG/DL (ref 0.2–1)
BUN SERPL-MCNC: 20 MG/DL (ref 6–20)
CALCIUM UR-MCNC: 9.5 MG/DL (ref 8.5–10.3)
CHLORIDE SERPL-SCNC: 98 MMOL/L (ref 101–111)
CO2 SERPL-SCNC: 32 MMOL/L (ref 21–32)
CREAT SERPLBLD-SCNC: 0.8 MG/DL (ref 0.6–1.3)
EOSINOPHIL # BLD AUTO: 0 10^3/UL (ref 0–0.7)
EOSINOPHIL NFR BLD AUTO: 1.1 %
ERYTHROCYTE [DISTWIDTH] IN BLOOD BY AUTOMATED COUNT: 12.3 % (ref 12–15)
GFRSERPLBLD MDRD-ARVRAT: 73 ML/MIN/{1.73_M2} (ref 89–?)
GLOBULIN SER-MCNC: 1.8 G/DL (ref 2.1–4.2)
GLUCOSE SERPL-MCNC: 115 MG/DL (ref 74–104)
HCT VFR BLD AUTO: 35.2 % (ref 37–47)
HGB UR QL STRIP: 11.8 G/DL (ref 12–16)
LYMPHOCYTES # SPEC AUTO: 1.2 10^3/UL (ref 1.5–3.5)
LYMPHOCYTES NFR BLD AUTO: 34.5 %
MCH RBC QN AUTO: 30.6 PG (ref 27–31)
MCHC RBC AUTO-ENTMCNC: 33.5 G/DL (ref 32–36)
MCV RBC AUTO: 91.4 FL (ref 81–99)
MONOCYTES # BLD AUTO: 0.3 10^3/UL (ref 0–1)
MONOCYTES NFR BLD AUTO: 7.8 %
NEUTROPHILS # BLD AUTO: 2 10^3/UL (ref 1.5–6.6)
NEUTROPHILS # SNV AUTO: 3.6 X10^3/UL (ref 4.8–10.8)
NEUTROPHILS NFR BLD AUTO: 56 %
NRBC # BLD AUTO: 0 /100WBC
NRBC # BLD AUTO: 0 X10^3/UL
PDW BLD AUTO: 9.7 FL (ref 7.9–10.8)
PLATELET # BLD: 242 10^3/UL (ref 130–450)
POTASSIUM SERPL-SCNC: 4.3 MMOL/L (ref 3.5–4.5)
PROT SPEC-MCNC: 6.1 G/DL (ref 6.4–8.9)
RBC MAR: 3.85 10^6/UL (ref 4.2–5.4)
SODIUM SERPLBLD-SCNC: 135 MMOL/L (ref 135–145)

## 2024-05-08 PROCEDURE — 36415 COLL VENOUS BLD VENIPUNCTURE: CPT

## 2024-05-08 PROCEDURE — 85025 COMPLETE CBC W/AUTO DIFF WBC: CPT

## 2024-05-08 PROCEDURE — 85651 RBC SED RATE NONAUTOMATED: CPT

## 2024-05-08 PROCEDURE — 80053 COMPREHEN METABOLIC PANEL: CPT

## 2024-06-27 ENCOUNTER — HOSPITAL ENCOUNTER (OUTPATIENT)
Dept: HOSPITAL 76 - DI | Age: 59
Discharge: HOME | End: 2024-06-27
Attending: NURSE PRACTITIONER
Payer: COMMERCIAL

## 2024-06-27 DIAGNOSIS — Z12.31: Primary | ICD-10-CM

## 2024-06-27 DIAGNOSIS — R92.333: ICD-10-CM

## 2024-06-28 NOTE — MAMMOGRAPHY REPORT
BILATERAL DIGITAL SCREENING MAMMOGRAM 3D/2D: 6/27/2024

 

CLINICAL: Routine screening.  

 

Comparison is made to exams dated:  5/22/2022 mammogram, 4/19/2021 mammogram, 12/24/2020 breast MRI, 
9/10/2020 mammogram, 7/8/2020 mammogram - [facility], and 8/19/2019 mammogram - [facility].  

 

Both breasts are heterogeneously dense, which may obscure small masses (category c / 51-75% glandular
 tissue).  

 

There are benign calcifications in the left breast.  

No significant masses, calcifications, or other findings are seen in either breast.  

There has been no significant interval change.

 

IMPRESSION: BENIGN

There is no mammographic evidence of malignancy. A 1 year screening mammogram is recommended.  

 

Based on the Tyrer Cuzick model (a risk assessment model) the patient's lifetime risk is 9.2% and her
 10 year risk is 3.7%. According to the ACR, ACS, and NCCN guidelines, an annual breast MRI exam valerie
g with mammogram is recommended if the patient's lifetime risk is 20% or greater.

 

 

This exam was interpreted at Station ID: [id number].  

 

NOTE: For mammograms, a report in lay terms will be sent to the patient. Approximately 15% of breast 
malignancies will not be visualized mammographically. In the management of a palpable breast mass, a 
negative mammogram must not discourage biopsy of a clinically suspicious lesion.

 

Electronically Signed By: Isaac perez/easton:6/27/2024 16:27:14  

 

 

letter sent: No_Letter  

ACR BI-RADS Category 2: Benign Finding(s) 3342F

PARENCHYMAL PATTERN: (D) - The breast(s) demonstrate(s) heterogeneously dense fibroglandular parenchy
ma.

BI-RADS CATEGORY: (2) - 2

RECOMMENDATION: (ANNUAL)  - Recommend routine annual screening mammography.

[id number]

1 year screening

LATERALITY: (B)

## 2024-07-12 ENCOUNTER — HOSPITAL ENCOUNTER (OUTPATIENT)
Dept: HOSPITAL 76 - DI | Age: 59
Discharge: HOME | End: 2024-07-12
Attending: NURSE PRACTITIONER
Payer: COMMERCIAL

## 2024-07-12 DIAGNOSIS — R43.8: Primary | ICD-10-CM

## 2024-07-12 NOTE — CT REPORT
PROCEDURE:  Sinus

 

INDICATIONS:  SINUSITIS

 

TECHNIQUE: 

Noncontrast 3.0 mm axial images acquired from the frontal sinuses to the mid-sella, with coronal and 
sagittal reformats. For radiation dose reduction, the following was used:  automated exposure control
, adjustment of mA and/or kV according to patient size. 

 

COMPARISON:  9/19/2021

 

FINDINGS:  

Image quality:  Metallic streak artifact is seen, which is reduced by the metal suppression algorithm
.   

 

Maxillary Sinuses:  No bony remodeling or destruction. Along the superior medial aspect of the left m
axillary sinus, there is a stable bony focus seen, leading to narrowing within this region, as on ser
ies 10 image 24. Sinuses are clear.  

 

Ethmoid Air Cells:  No bony remodeling or destruction.  Sinuses are clear.  

 

Sphenoid Sinuses:  No bony remodeling or destruction.  Sinuses are clear.  

 

Frontal Sinuses:  No bony remodeling or destruction.  Sinuses are clear.  

 

Ostiomeatal Complexes:  Ostiomeatal complexes are patent.  No Selwyn cells.  

 

Miscellaneous:  Visualized intra-orbital contents are normal.  No venessa bullosa.   There is minimal 
rightward nasal septal deviation.    

 

 

IMPRESSION:  

 

No significant active paranasal sinus disease can be seen.

 

Note is made of a stable bony excrescence along the superior medial aspect of the left maxillary sinu
s, which leads to narrowing of this region.

 

Reviewed by: Jose Kelley MD on 7/12/2024 10:49 AM ADAM

Approved by: Jose Kelley MD on 7/12/2024 10:49 AM ADAM

 

 

Station ID:  [id number]

## 2024-09-03 ENCOUNTER — HOSPITAL ENCOUNTER (OUTPATIENT)
Dept: HOSPITAL 76 - LAB | Age: 59
Discharge: HOME | End: 2024-09-03
Attending: INTERNAL MEDICINE
Payer: COMMERCIAL

## 2024-09-03 DIAGNOSIS — M05.79: Primary | ICD-10-CM

## 2024-09-03 LAB
ALBUMIN DIAFP-MCNC: 4 G/DL (ref 3.2–5.5)
ALBUMIN/GLOB SERPL: 2 {RATIO} (ref 1–2.2)
ALP SERPL-CCNC: 53 IU/L (ref 42–121)
ALT SERPL W P-5'-P-CCNC: 8 IU/L (ref 10–60)
ANION GAP SERPL CALCULATED.4IONS-SCNC: 5 MMOL/L (ref 6–13)
AST SERPL W P-5'-P-CCNC: 16 IU/L (ref 10–42)
BASOPHILS NFR BLD AUTO: 0 10^3/UL (ref 0–0.1)
BASOPHILS NFR BLD AUTO: 0.6 %
BILIRUB BLD-MCNC: 0.4 MG/DL (ref 0.2–1)
BUN SERPL-MCNC: 12 MG/DL (ref 6–20)
CALCIUM UR-MCNC: 9 MG/DL (ref 8.5–10.3)
CHLORIDE SERPL-SCNC: 100 MMOL/L (ref 101–111)
CO2 SERPL-SCNC: 31 MMOL/L (ref 21–32)
CREAT SERPLBLD-SCNC: 0.8 MG/DL (ref 0.6–1.3)
EOSINOPHIL # BLD AUTO: 0 10^3/UL (ref 0–0.7)
EOSINOPHIL NFR BLD AUTO: 0.9 %
ERYTHROCYTE [DISTWIDTH] IN BLOOD BY AUTOMATED COUNT: 12.7 % (ref 12–15)
GFRSERPLBLD MDRD-ARVRAT: 73 ML/MIN/{1.73_M2} (ref 89–?)
GLOBULIN SER-MCNC: 2 G/DL (ref 2.1–4.2)
GLUCOSE SERPL-MCNC: 86 MG/DL (ref 74–104)
HCT VFR BLD AUTO: 37.5 % (ref 37–47)
HGB UR QL STRIP: 12.5 G/DL (ref 12–16)
LYMPHOCYTES # SPEC AUTO: 1.4 10^3/UL (ref 1.5–3.5)
LYMPHOCYTES NFR BLD AUTO: 42.9 %
MCH RBC QN AUTO: 30.9 PG (ref 27–31)
MCHC RBC AUTO-ENTMCNC: 33.3 G/DL (ref 32–36)
MCV RBC AUTO: 92.6 FL (ref 81–99)
MONOCYTES # BLD AUTO: 0.3 10^3/UL (ref 0–1)
MONOCYTES NFR BLD AUTO: 10.1 %
NEUTROPHILS # BLD AUTO: 1.4 10^3/UL (ref 1.5–6.6)
NEUTROPHILS # SNV AUTO: 3.2 X10^3/UL (ref 4.8–10.8)
NEUTROPHILS NFR BLD AUTO: 45.2 %
NRBC # BLD AUTO: 0 /100WBC
NRBC # BLD AUTO: 0 X10^3/UL
PDW BLD AUTO: 10.1 FL (ref 7.9–10.8)
PLATELET # BLD: 241 10^3/UL (ref 130–450)
POTASSIUM SERPL-SCNC: 4.2 MMOL/L (ref 3.5–4.5)
PROT SPEC-MCNC: 6 G/DL (ref 6.4–8.9)
RBC MAR: 4.05 10^6/UL (ref 4.2–5.4)
SODIUM SERPLBLD-SCNC: 136 MMOL/L (ref 135–145)

## 2024-09-03 PROCEDURE — 80053 COMPREHEN METABOLIC PANEL: CPT

## 2024-09-03 PROCEDURE — 36415 COLL VENOUS BLD VENIPUNCTURE: CPT

## 2024-09-03 PROCEDURE — 85025 COMPLETE CBC W/AUTO DIFF WBC: CPT

## 2024-09-03 PROCEDURE — 85651 RBC SED RATE NONAUTOMATED: CPT
